# Patient Record
Sex: FEMALE | Race: BLACK OR AFRICAN AMERICAN | NOT HISPANIC OR LATINO | Employment: OTHER | URBAN - METROPOLITAN AREA
[De-identification: names, ages, dates, MRNs, and addresses within clinical notes are randomized per-mention and may not be internally consistent; named-entity substitution may affect disease eponyms.]

---

## 2023-11-17 RX ORDER — LEVETIRACETAM 500 MG/1
500 TABLET ORAL 2 TIMES DAILY
COMMUNITY
Start: 2014-04-10

## 2023-11-17 RX ORDER — GABAPENTIN 800 MG/1
800 TABLET ORAL 3 TIMES DAILY
COMMUNITY

## 2023-11-17 RX ORDER — GABAPENTIN 100 MG/1
CAPSULE ORAL 2 TIMES DAILY
Status: ON HOLD | COMMUNITY
End: 2024-01-17 | Stop reason: WASHOUT

## 2023-11-29 ENCOUNTER — OFFICE VISIT (OUTPATIENT)
Dept: OTOLARYNGOLOGY | Facility: CLINIC | Age: 68
End: 2023-11-29
Payer: MEDICARE

## 2023-11-29 VITALS — BODY MASS INDEX: 33.63 KG/M2 | WEIGHT: 178 LBS

## 2023-11-29 DIAGNOSIS — H90.3 SENSORINEURAL HEARING LOSS, BILATERAL: ICD-10-CM

## 2023-11-29 DIAGNOSIS — H92.03 OTALGIA OF BOTH EARS: Primary | ICD-10-CM

## 2023-11-29 DIAGNOSIS — M26.609 TEMPOROMANDIBULAR JOINT DISORDER: ICD-10-CM

## 2023-11-29 DIAGNOSIS — M54.2 NECK PAIN: ICD-10-CM

## 2023-11-29 PROCEDURE — 1159F MED LIST DOCD IN RCRD: CPT | Performed by: GENERAL PRACTICE

## 2023-11-29 PROCEDURE — 1036F TOBACCO NON-USER: CPT | Performed by: GENERAL PRACTICE

## 2023-11-29 PROCEDURE — 99214 OFFICE O/P EST MOD 30 MIN: CPT | Performed by: GENERAL PRACTICE

## 2023-11-29 RX ORDER — CARBIDOPA AND LEVODOPA 25; 100 MG/1; MG/1
2 TABLET ORAL 3 TIMES DAILY
COMMUNITY

## 2023-11-29 RX ORDER — CITALOPRAM 10 MG/1
TABLET ORAL
Status: ON HOLD | COMMUNITY
Start: 2021-11-25 | End: 2024-01-17 | Stop reason: WASHOUT

## 2023-11-29 RX ORDER — ARIPIPRAZOLE 5 MG/1
1 TABLET ORAL DAILY
Status: ON HOLD | COMMUNITY
End: 2024-01-17 | Stop reason: WASHOUT

## 2023-11-29 RX ORDER — VIT C/E/ZN/COPPR/LUTEIN/ZEAXAN 250MG-90MG
50 CAPSULE ORAL DAILY
COMMUNITY

## 2023-11-29 RX ORDER — BUSPIRONE HYDROCHLORIDE 10 MG/1
10 TABLET ORAL 2 TIMES DAILY
COMMUNITY
Start: 2022-11-02

## 2023-11-29 ASSESSMENT — PATIENT HEALTH QUESTIONNAIRE - PHQ9
SUM OF ALL RESPONSES TO PHQ9 QUESTIONS 1 AND 2: 0
2. FEELING DOWN, DEPRESSED OR HOPELESS: NOT AT ALL
1. LITTLE INTEREST OR PLEASURE IN DOING THINGS: NOT AT ALL

## 2023-11-29 NOTE — PROGRESS NOTES
Otolaryngology - Head and Neck Surgery Outpatient New Patient Visit Note        Assessment/Plan   Problem List Items Addressed This Visit    None  Visit Diagnoses         Codes    Otalgia of both ears    -  Primary H92.03    Neck pain     M54.2    Relevant Orders    Referral to Physical Therapy    Temporomandibular joint disorder     M26.609    Relevant Orders    Referral to Physical Therapy    Sensorineural hearing loss, bilateral     H90.3            Recent poorly defined ear/neck discomfort without evidence of otitis on exam today or  prior CT head from earlier in the year.    Discussed possible association with TMJ and neck discomforts.  Pt seeing dental/OMFS for dental issues and agree with ongoing management for this.  Discussed conservative management for TMJ.     Will place referral for PT.     Discussed possible further imaging or referral for 2nd opinion but pt and family decline for now.         The etiology of temporomandibular joint disorders (TMD) was discussed in detail with patient including: structural issues such as alterations in dental occlusion (the alignment of the upper and lower teeth) that affect maxillomandibular position and function. These issues may or may not be associated with joint trauma, poor posture or cervicogenic etiologies.  Possible psychologic factors include anxiety, depression and PTSD. Multiple other contributing and comorbid factors, including biological, behavioral, environmental, and cognitive disorders which can all contribute to the development of symptoms were also reviewed with the patient.      PLAN:  1) Lifestyle interventions for management of TMJ dysfunction were discussed with the patient today including use of warm compresses, massage of the TMJ joint, and/or use of NSAIDS PRN for pain and intimation and soft chew diet.      Additional options for further evaluation and management of TMD were discussed with the patient today and may include the following  referrals upon patient request:  1) Referral to physical therapy for further evaluation and management of cervicogenic/ postural related issues.  2) Referral to [facility] for evaluation and management with alternative therapeutic modalities such as massage or acupuncture.   3) Referral to dental medicine for further evaluation and management of structural/ mechanical concerns such as malocclusion or bruxism.       Follow up:  -plan for follow up in clinic as needed    All of the above findings, impressions, treatment planning and follow up plans were discussed with the patient who indicated understanding.  the patient was instructed to contact or return to clinic sooner if symptoms/signs persist or worsen despite the above management.      Abelardo Amezquita MD  Otolaryngology - Head and Neck Surgery            History Of Present Illness  Darwin Castro is a 68 y.o. female presenting for follow up of intermittent ear discomfort.   Brother present to aid in history due to history of stroke and associated aphasia.     History somewhat difficult to obtain, but pt reports intermittent ear discomfort noted on each side at different times.  No obvious pattern of discomfort.  Some possible radiation to neck or jaw.   Recently seen by dental with plans to address dental infection of front upper incisor.  Denies recent symptoms of rhinitis or sinusitis.      Audiogram  completed jan2023 showed mild downsloping to moderate SNHL without middle ear dysfunction.  CT head obtained in feb2023 after a fall showed no evidence of otitis or sinusitis.        Recall    67yoF with a history of parkinsons ,hemiparesis and aphasia brought in for discussion of concerns of ear discomfort and some hearing loss.      the pt is a fairly poor historian and escort/chaperone has limited information.   the pt reports intermittent left otalgia, more noted when sleeping on the left side. no associated hearing change, otorrhea, vertigo. some  baseline hearing loss, but no recent hearing evaluation.   no signficant prior history of recurrent otitis or otologic surgery/trauma.  no inciting illness or trauma for recent symptoms.   pain does not radiate.   denies TMJ region discomfort with chewing.   notes some history of L>R neck MSK discomfort which may be associated with ear pain.             Past Medical History  She has a past medical history of Bipolar disorder, current episode manic without psychotic features, unspecified (CMS/HCC), Chronic viral hepatitis B without delta-agent (CMS/HCC), Cocaine abuse, in remission (CMS/HCC), Other conditions influencing health status, Personal history of other diseases of the circulatory system, Personal history of other infectious and parasitic diseases, and Personal history of other venous thrombosis and embolism.    Surgical History  She has a past surgical history that includes Other surgical history (10/15/2019).     Social History  She reports that she has never smoked. She has never used smokeless tobacco. No history on file for alcohol use and drug use.    Family History  No family history on file.     Allergies  Penicillins    Review of Systems  ROS: Pertinent positives as noted in HPI.    - CONSTITUTIONAL: Does not report weight loss, fever or chills.    - HEENT:   Ear: Does not report  , vertigo,       , otorrhea  Nose: Does not report congestion, rhinorrhea, epistaxis, decreased smell  Throat: Does not report pain, dysphagia, odynophagia  Larynx: Does not report hoarseness,  difficulty breathing, pain with speaking (odynophonia)  Neck: Does not report new masses, pain, swelling  Face: Does not report sinus pain, pressure, swelling, numbness, weakness     - RESPIRATORY: Does not report SOB or cough.    - CV: Does not report palpitations or chest pain.     - GI: Does not report abdominal pain, nausea, vomiting or diarrhea.    - : Does not report dysuria or urinary frequency.    - MSK: Does not report  myalgia or joint pain.    - SKIN: Does not report rash or pruritus.    - NEUROLOGICAL: Does not report headache or syncope.    - PSYCHIATRIC: Does not report recent changes in mood. Does not report anxiety or depression.         Physical Exam:     GENERAL:   Alert & Oriented to person, place and time;   Well developed and well nourished. Poorly Conversant but  cooperative with examination.     HEAD:   Normocephalic, atraumatic. No sinus tenderness to palpation. Normal parotid bilaterally. Normal facial strength.     NEUROLOGIC:   Cranial nerves II-XII grossly intact, gait WNL. Normal mood and affect.    EYES:   Extraocular movements intact. Pupils equal, round, reactive to light and accommodation. No nystagmus, no ptosis. no scleral injection.    EAR:   Normal auricle. No discomfort or TTP with manipulation.   Handheld otoscopic exam showed normal external auditory canals bilaterally. No purulence or EAC inflammation. Minimal cerumen.   Right tympanic membrane clear and mobile without evidence of perforation, retraction or middle ear effusion.   Left tympanic membrane clear and mobile without evidence of perforation, retraction or middle ear effusion.     NOSE:   No external deformity. No external nasal lesions, lacerations, or scars. Nasal tip symmetrical with normal nasal valves.   Nasal cavity with essentially midline septum, normal mucosa and turbinates. No lesions, masses, purulence or polyps.     OC/OP:   Mucous membranes moist, no masses, lesions or exudates.   Normal tongue, floor of mouth, teeth, gums, lips. Normal posterior pharyngeal wall.    Normal tonsils without erythema, exudate or obvious calculi     NECK:   No neck masses or thyroid enlargement. Trachea midline. No tenderness to palpation    LYMPHATIC:   No cervical lymphadenopathy.     RESPIRATORY:   Symmetric chest elevation & no retractions. No significant hoarseness. No increased work of breathing.    CV:   No clubbing or cyanosis. No obvious  edema    Skin:   No facial rashes, vesicles or lesions.     Extremities:   No gross abnormalities      Clinic Procedure        Information review:  External sources (notes, imaging, lab results) listed below personally reviewed to aid in medical decision making process.  -  -  -

## 2023-12-07 ENCOUNTER — OFFICE VISIT (OUTPATIENT)
Dept: NEUROLOGY | Facility: CLINIC | Age: 68
End: 2023-12-07
Payer: MEDICARE

## 2023-12-07 VITALS — DIASTOLIC BLOOD PRESSURE: 61 MMHG | HEART RATE: 59 BPM | SYSTOLIC BLOOD PRESSURE: 141 MMHG | TEMPERATURE: 96.9 F

## 2023-12-07 DIAGNOSIS — I69.351 HEMIPARESIS AFFECTING RIGHT SIDE AS LATE EFFECT OF CEREBROVASCULAR ACCIDENT (MULTI): ICD-10-CM

## 2023-12-07 DIAGNOSIS — G40.209 PARTIAL SYMPTOMATIC EPILEPSY WITH COMPLEX PARTIAL SEIZURES, NOT INTRACTABLE, WITHOUT STATUS EPILEPTICUS (MULTI): ICD-10-CM

## 2023-12-07 DIAGNOSIS — G20.A1 PARKINSON'S DISEASE WITHOUT DYSKINESIA OR FLUCTUATING MANIFESTATIONS (MULTI): ICD-10-CM

## 2023-12-07 DIAGNOSIS — I69.320 APHASIA AS LATE EFFECT OF CEREBROVASCULAR ACCIDENT: ICD-10-CM

## 2023-12-07 DIAGNOSIS — R51.9 NONINTRACTABLE HEADACHE, UNSPECIFIED CHRONICITY PATTERN, UNSPECIFIED HEADACHE TYPE: Primary | ICD-10-CM

## 2023-12-07 PROCEDURE — 1159F MED LIST DOCD IN RCRD: CPT | Performed by: PSYCHIATRY & NEUROLOGY

## 2023-12-07 PROCEDURE — 1036F TOBACCO NON-USER: CPT | Performed by: PSYCHIATRY & NEUROLOGY

## 2023-12-07 PROCEDURE — 99214 OFFICE O/P EST MOD 30 MIN: CPT | Performed by: PSYCHIATRY & NEUROLOGY

## 2023-12-07 NOTE — PATIENT INSTRUCTIONS
Continue gabapentin 800 mg tid for headache prevention    Continue carbidopa/levodopa  three times daily with meals for Parkinsonism    Continue levetiracetam 500 mg twice daily for seizure control

## 2023-12-07 NOTE — PROGRESS NOTES
NEUROLOGY OUTPATIENT FOLLOW-UP NOTE    Assessment/Plan   Diagnoses and all orders for this visit:  Nonintractable headache, unspecified chronicity pattern, unspecified headache type  Parkinson's disease without dyskinesia or fluctuating manifestations  Hemiparesis affecting right side as late effect of cerebrovascular accident (CMS/HCC)  Aphasia as late effect of cerebrovascular accident  Partial symptomatic epilepsy with complex partial seizures, not intractable, without status epilepticus (CMS/HCC)      IMPRESSION: Stably improved chronic daily headaches in the context of prior craniotomy, Stable Parkinson's disease. Residual right hemiparesis and aphasia referable to prior stroke. Controlled partial seizures on levetiracetam.     PLAN: Continue gabapentin 800 mg tid for headache prophylaxis. To continue the current dose of carbidopa/levodopa  tid with meals for the PD. To continue levetiracetam 500 mg bid for seizure prophylaxis. I will see her in six months or prn.     Crispin Marte Jr., M.D., FAAN    ----------    Subjective     Reangélica LYNDSAY Castro is a 68 y.o. year old female here for follow-up.  Her brother accompanies her today.  She is a resident of [facility].    Gabapentin 800 mg tid stably reduces headache severity.     No seizures on levetiracetam. She remains satisfied with the effect of the current dose of the carbidopa/levodopa on the Parkinson's.      Remains with right hemiparesis and mild expressive aphasia from prior stroke.      She denies other focal neurological symptoms including dysarthria, dysphagia, diplopia, other focal weakness, other focal sensory change, ataxia, or vertigo, among others.     There is no problem list on file for this patient.    Past Medical History:   Diagnosis Date    Bipolar disorder, current episode manic without psychotic features, unspecified (CMS/HCC)     Bipolar disorder, current episode manic without psychotic features    Chronic viral hepatitis B  without delta-agent (CMS/HCC)     Chronic hepatitis B    Cocaine abuse, in remission (CMS/HCC)     History of cocaine abuse    Other conditions influencing health status     Stroke syndrome    Personal history of other diseases of the circulatory system     History of hypertension    Personal history of other infectious and parasitic diseases     History of hepatitis C virus infection    Personal history of other venous thrombosis and embolism     History of deep venous thrombosis     Past Surgical History:   Procedure Laterality Date    OTHER SURGICAL HISTORY  10/15/2019    Brain surgery     Social History     Tobacco Use    Smoking status: Never    Smokeless tobacco: Never   Substance Use Topics    Alcohol use: Never     family history is not on file.    Current Outpatient Medications:     ARIPiprazole (Abilify) 5 mg tablet, Take 1 tablet (5 mg) by mouth once daily., Disp: , Rfl:     busPIRone (Buspar) 10 mg tablet, Take by mouth twice a day., Disp: , Rfl:     carbidopa-levodopa (Sinemet)  mg tablet, Take by mouth., Disp: , Rfl:     cholecalciferol (Vitamin D-3) 25 MCG (1000 UT) capsule, Take by mouth., Disp: , Rfl:     citalopram (CeleXA) 10 mg tablet, Take by mouth., Disp: , Rfl:     gabapentin (Neurontin) 100 mg capsule, Take by mouth twice a day., Disp: , Rfl:     gabapentin (Neurontin) 800 mg tablet, Take by mouth., Disp: , Rfl:     levETIRAcetam (Keppra) 500 mg tablet, Take by mouth., Disp: , Rfl:   Allergies   Allergen Reactions    Penicillins Unknown       Objective     /61   Pulse 59   Temp 36.1 °C (96.9 °F)     CONSTITUTIONAL:  No acute distress    MENTAL STATUS:  Awake, alert, oriented to self, but not to place or time, with poor short-term memory, fair awareness of recent events, normal attention span, concentration, and fund of knowledge.    SPEECH AND LANGUAGE:  Dysnomia, dysfluent speech, but can follow most commands, has difficulty with complex commands, has no dysarthria    CRANIAL  NERVES:  II-Vision present, visual fields full to confrontational testing    III/IV/VI--EOMs are present in all directions.  Pupils are symmetrically reactive in dim light.  No ptosis.    V--Normal facial sensation.    VII--No facial asymmetry.    VIII--Hearing present to voice bilaterally.    IX/X--Symmetric soft palate rise.    XI--Normal trapezius power on the left, 4/5 right.    XII--Tongue protrudes without deviation.    MOTOR:  Diffuse right arm weakness: 4/5 deltoid and biceps, 3/5 triceps, 2/5 finger ext, 3/5 finger flex/, diffuse 4/5 right leg power. Hypertonia of the right limbs.     SENSORY:  Present pin sensation in both arms and both legs.    COORDINATION:  Normal finger-to-nose and heel-to-shin testing in both arms and both legs.    REFLEXES are brisk at the right biceps, triceps, brachioradialis, patella, normal on the left, depressed at both ankles.  The plantar responses are flexor.    GAIT did not evaluate this time, but it has been spastic with external rotation of the right leg in the past.      Crispin Marte Jr., M.D., FAAN

## 2024-01-16 ENCOUNTER — APPOINTMENT (OUTPATIENT)
Dept: RADIOLOGY | Facility: HOSPITAL | Age: 69
End: 2024-01-16
Payer: MEDICARE

## 2024-01-16 ENCOUNTER — HOSPITAL ENCOUNTER (OUTPATIENT)
Facility: HOSPITAL | Age: 69
Setting detail: OBSERVATION
Discharge: NURSING, REHAB OR LONG-TERM CARE | End: 2024-01-18
Attending: EMERGENCY MEDICINE | Admitting: INTERNAL MEDICINE
Payer: MEDICARE

## 2024-01-16 DIAGNOSIS — R05.1 ACUTE COUGH: ICD-10-CM

## 2024-01-16 DIAGNOSIS — R41.82 ALTERED MENTAL STATUS, UNSPECIFIED ALTERED MENTAL STATUS TYPE: ICD-10-CM

## 2024-01-16 DIAGNOSIS — G45.9 TIA (TRANSIENT ISCHEMIC ATTACK): Primary | ICD-10-CM

## 2024-01-16 DIAGNOSIS — R53.1 LEFT-SIDED WEAKNESS: ICD-10-CM

## 2024-01-16 PROBLEM — F14.11 COCAINE ABUSE, IN REMISSION (MULTI): Status: ACTIVE | Noted: 2024-01-16

## 2024-01-16 PROBLEM — G20.A1 PARKINSON'S DISEASE (MULTI): Status: ACTIVE | Noted: 2024-01-16

## 2024-01-16 PROBLEM — G40.209 PARTIAL SYMPTOMATIC EPILEPSY WITH COMPLEX PARTIAL SEIZURES, NOT INTRACTABLE, WITHOUT STATUS EPILEPTICUS (MULTI): Status: ACTIVE | Noted: 2024-01-16

## 2024-01-16 PROBLEM — B18.1: Status: ACTIVE | Noted: 2024-01-16

## 2024-01-16 PROBLEM — Z86.718 HISTORY OF DVT (DEEP VEIN THROMBOSIS): Chronic | Status: ACTIVE | Noted: 2024-01-16

## 2024-01-16 PROBLEM — B18.1 CHRONIC VIRAL HEPATITIS B (MULTI): Chronic | Status: ACTIVE | Noted: 2024-01-16

## 2024-01-16 PROBLEM — Z86.718 PERSONAL HISTORY OF OTHER VENOUS THROMBOSIS AND EMBOLISM: Status: ACTIVE | Noted: 2024-01-16

## 2024-01-16 PROBLEM — F31.10 BIPOLAR DISORDER, CURRENT EPISODE MANIC WITHOUT PSYCHOTIC FEATURES, UNSPECIFIED (MULTI): Status: ACTIVE | Noted: 2024-01-16

## 2024-01-16 PROBLEM — Z86.19 HISTORY OF HEPATITIS C VIRUS INFECTION: Status: ACTIVE | Noted: 2024-01-16

## 2024-01-16 PROBLEM — G81.90 HEMIPARESIS (MULTI): Status: ACTIVE | Noted: 2023-02-08

## 2024-01-16 PROBLEM — I69.320 APHASIA FOLLOWING CEREBROVASCULAR ACCIDENT (CVA): Status: ACTIVE | Noted: 2024-01-16

## 2024-01-16 PROBLEM — I10 ESSENTIAL (PRIMARY) HYPERTENSION: Status: ACTIVE | Noted: 2023-02-08

## 2024-01-16 LAB
ALBUMIN SERPL BCP-MCNC: 3.9 G/DL (ref 3.4–5)
ALP SERPL-CCNC: 61 U/L (ref 33–136)
ALT SERPL W P-5'-P-CCNC: <3 U/L (ref 7–45)
ANION GAP SERPL CALC-SCNC: 11 MMOL/L (ref 10–20)
APPEARANCE UR: CLEAR
AST SERPL W P-5'-P-CCNC: 22 U/L (ref 9–39)
BASOPHILS # BLD AUTO: 0.03 X10*3/UL (ref 0–0.1)
BASOPHILS NFR BLD AUTO: 0.4 %
BILIRUB SERPL-MCNC: 0.4 MG/DL (ref 0–1.2)
BILIRUB UR STRIP.AUTO-MCNC: NEGATIVE MG/DL
BUN SERPL-MCNC: 9 MG/DL (ref 6–23)
CALCIUM SERPL-MCNC: 8.9 MG/DL (ref 8.6–10.3)
CARDIAC TROPONIN I PNL SERPL HS: 10 NG/L (ref 0–13)
CHLORIDE SERPL-SCNC: 99 MMOL/L (ref 98–107)
CHOLEST SERPL-MCNC: 173 MG/DL (ref 0–199)
CHOLESTEROL/HDL RATIO: 2.8
CO2 SERPL-SCNC: 30 MMOL/L (ref 21–32)
COLOR UR: YELLOW
CREAT SERPL-MCNC: 0.86 MG/DL (ref 0.5–1.05)
EGFRCR SERPLBLD CKD-EPI 2021: 74 ML/MIN/1.73M*2
EOSINOPHIL # BLD AUTO: 0.01 X10*3/UL (ref 0–0.7)
EOSINOPHIL NFR BLD AUTO: 0.1 %
ERYTHROCYTE [DISTWIDTH] IN BLOOD BY AUTOMATED COUNT: 13 % (ref 11.5–14.5)
FLUAV RNA RESP QL NAA+PROBE: NOT DETECTED
FLUBV RNA RESP QL NAA+PROBE: NOT DETECTED
GLUCOSE BLD MANUAL STRIP-MCNC: 94 MG/DL (ref 74–99)
GLUCOSE SERPL-MCNC: 86 MG/DL (ref 74–99)
GLUCOSE UR STRIP.AUTO-MCNC: NEGATIVE MG/DL
HCT VFR BLD AUTO: 41.9 % (ref 36–46)
HDLC SERPL-MCNC: 61.6 MG/DL
HGB BLD-MCNC: 13.3 G/DL (ref 12–16)
IMM GRANULOCYTES # BLD AUTO: 0.02 X10*3/UL (ref 0–0.7)
IMM GRANULOCYTES NFR BLD AUTO: 0.3 % (ref 0–0.9)
INR PPP: 1.1 (ref 0.9–1.1)
KETONES UR STRIP.AUTO-MCNC: ABNORMAL MG/DL
LDLC SERPL CALC-MCNC: 100 MG/DL
LEUKOCYTE ESTERASE UR QL STRIP.AUTO: NEGATIVE
LYMPHOCYTES # BLD AUTO: 2.37 X10*3/UL (ref 1.2–4.8)
LYMPHOCYTES NFR BLD AUTO: 35.3 %
MAGNESIUM SERPL-MCNC: 1.6 MG/DL (ref 1.6–2.4)
MCH RBC QN AUTO: 30 PG (ref 26–34)
MCHC RBC AUTO-ENTMCNC: 31.7 G/DL (ref 32–36)
MCV RBC AUTO: 94 FL (ref 80–100)
MONOCYTES # BLD AUTO: 0.95 X10*3/UL (ref 0.1–1)
MONOCYTES NFR BLD AUTO: 14.2 %
MUCOUS THREADS #/AREA URNS AUTO: NORMAL /LPF
NEUTROPHILS # BLD AUTO: 3.33 X10*3/UL (ref 1.2–7.7)
NEUTROPHILS NFR BLD AUTO: 49.7 %
NITRITE UR QL STRIP.AUTO: NEGATIVE
NON HDL CHOLESTEROL: 111 MG/DL (ref 0–149)
NRBC BLD-RTO: 0 /100 WBCS (ref 0–0)
PH UR STRIP.AUTO: 5 [PH]
PLATELET # BLD AUTO: 158 X10*3/UL (ref 150–450)
POTASSIUM SERPL-SCNC: 3.3 MMOL/L (ref 3.5–5.3)
PROT SERPL-MCNC: 7.3 G/DL (ref 6.4–8.2)
PROT UR STRIP.AUTO-MCNC: ABNORMAL MG/DL
PROTHROMBIN TIME: 12.4 SECONDS (ref 9.8–12.8)
RBC # BLD AUTO: 4.44 X10*6/UL (ref 4–5.2)
RBC # UR STRIP.AUTO: NEGATIVE /UL
RBC #/AREA URNS AUTO: NORMAL /HPF
SARS-COV-2 RNA RESP QL NAA+PROBE: DETECTED
SODIUM SERPL-SCNC: 137 MMOL/L (ref 136–145)
SP GR UR STRIP.AUTO: 1.06
SQUAMOUS #/AREA URNS AUTO: NORMAL /HPF
TRIGL SERPL-MCNC: 55 MG/DL (ref 0–149)
TSH SERPL-ACNC: 0.9 MIU/L (ref 0.44–3.98)
UROBILINOGEN UR STRIP.AUTO-MCNC: 2 MG/DL
VLDL: 11 MG/DL (ref 0–40)
WBC # BLD AUTO: 6.7 X10*3/UL (ref 4.4–11.3)
WBC #/AREA URNS AUTO: NORMAL /HPF

## 2024-01-16 PROCEDURE — 70496 CT ANGIOGRAPHY HEAD: CPT | Performed by: RADIOLOGY

## 2024-01-16 PROCEDURE — 80177 DRUG SCRN QUAN LEVETIRACETAM: CPT | Mod: AHULAB | Performed by: EMERGENCY MEDICINE

## 2024-01-16 PROCEDURE — 2500000001 HC RX 250 WO HCPCS SELF ADMINISTERED DRUGS (ALT 637 FOR MEDICARE OP): Performed by: EMERGENCY MEDICINE

## 2024-01-16 PROCEDURE — 80061 LIPID PANEL: CPT | Performed by: PHYSICIAN ASSISTANT

## 2024-01-16 PROCEDURE — 99222 1ST HOSP IP/OBS MODERATE 55: CPT | Performed by: PHYSICIAN ASSISTANT

## 2024-01-16 PROCEDURE — 99285 EMERGENCY DEPT VISIT HI MDM: CPT | Performed by: EMERGENCY MEDICINE

## 2024-01-16 PROCEDURE — 71045 X-RAY EXAM CHEST 1 VIEW: CPT | Mod: FOREIGN READ | Performed by: RADIOLOGY

## 2024-01-16 PROCEDURE — 70498 CT ANGIOGRAPHY NECK: CPT

## 2024-01-16 PROCEDURE — 83735 ASSAY OF MAGNESIUM: CPT | Performed by: EMERGENCY MEDICINE

## 2024-01-16 PROCEDURE — 82947 ASSAY GLUCOSE BLOOD QUANT: CPT | Mod: 59

## 2024-01-16 PROCEDURE — 83036 HEMOGLOBIN GLYCOSYLATED A1C: CPT | Mod: AHULAB | Performed by: PHYSICIAN ASSISTANT

## 2024-01-16 PROCEDURE — G0378 HOSPITAL OBSERVATION PER HR: HCPCS

## 2024-01-16 PROCEDURE — 85610 PROTHROMBIN TIME: CPT | Performed by: EMERGENCY MEDICINE

## 2024-01-16 PROCEDURE — 96361 HYDRATE IV INFUSION ADD-ON: CPT | Performed by: EMERGENCY MEDICINE

## 2024-01-16 PROCEDURE — 70450 CT HEAD/BRAIN W/O DYE: CPT

## 2024-01-16 PROCEDURE — 84484 ASSAY OF TROPONIN QUANT: CPT | Performed by: EMERGENCY MEDICINE

## 2024-01-16 PROCEDURE — 70498 CT ANGIOGRAPHY NECK: CPT | Performed by: RADIOLOGY

## 2024-01-16 PROCEDURE — 80053 COMPREHEN METABOLIC PANEL: CPT | Performed by: EMERGENCY MEDICINE

## 2024-01-16 PROCEDURE — 96365 THER/PROPH/DIAG IV INF INIT: CPT | Performed by: EMERGENCY MEDICINE

## 2024-01-16 PROCEDURE — 36415 COLL VENOUS BLD VENIPUNCTURE: CPT | Performed by: EMERGENCY MEDICINE

## 2024-01-16 PROCEDURE — 71045 X-RAY EXAM CHEST 1 VIEW: CPT

## 2024-01-16 PROCEDURE — 87636 SARSCOV2 & INF A&B AMP PRB: CPT | Performed by: EMERGENCY MEDICINE

## 2024-01-16 PROCEDURE — 81001 URINALYSIS AUTO W/SCOPE: CPT | Performed by: EMERGENCY MEDICINE

## 2024-01-16 PROCEDURE — 85025 COMPLETE CBC W/AUTO DIFF WBC: CPT | Performed by: EMERGENCY MEDICINE

## 2024-01-16 PROCEDURE — 70496 CT ANGIOGRAPHY HEAD: CPT

## 2024-01-16 PROCEDURE — 84443 ASSAY THYROID STIM HORMONE: CPT | Performed by: EMERGENCY MEDICINE

## 2024-01-16 PROCEDURE — 2550000001 HC RX 255 CONTRASTS: Performed by: EMERGENCY MEDICINE

## 2024-01-16 PROCEDURE — 70450 CT HEAD/BRAIN W/O DYE: CPT | Performed by: RADIOLOGY

## 2024-01-16 PROCEDURE — 2500000004 HC RX 250 GENERAL PHARMACY W/ HCPCS (ALT 636 FOR OP/ED): Performed by: EMERGENCY MEDICINE

## 2024-01-16 RX ORDER — ASPIRIN 81 MG/1
81 TABLET ORAL DAILY
Status: DISCONTINUED | OUTPATIENT
Start: 2024-01-16 | End: 2024-01-18 | Stop reason: HOSPADM

## 2024-01-16 RX ORDER — INSULIN LISPRO 100 [IU]/ML
0-5 INJECTION, SOLUTION INTRAVENOUS; SUBCUTANEOUS
Status: DISCONTINUED | OUTPATIENT
Start: 2024-01-17 | End: 2024-01-18 | Stop reason: HOSPADM

## 2024-01-16 RX ORDER — DOCUSATE SODIUM 100 MG/1
100 CAPSULE, LIQUID FILLED ORAL 2 TIMES DAILY
Status: DISCONTINUED | OUTPATIENT
Start: 2024-01-16 | End: 2024-01-18 | Stop reason: HOSPADM

## 2024-01-16 RX ORDER — ENOXAPARIN SODIUM 100 MG/ML
40 INJECTION SUBCUTANEOUS EVERY 24 HOURS
Status: DISCONTINUED | OUTPATIENT
Start: 2024-01-16 | End: 2024-01-18 | Stop reason: HOSPADM

## 2024-01-16 RX ORDER — ACETAMINOPHEN 325 MG/1
975 TABLET ORAL EVERY 6 HOURS PRN
Status: DISCONTINUED | OUTPATIENT
Start: 2024-01-16 | End: 2024-01-18 | Stop reason: HOSPADM

## 2024-01-16 RX ORDER — DEXTROSE 50 % IN WATER (D50W) INTRAVENOUS SYRINGE
25
Status: DISCONTINUED | OUTPATIENT
Start: 2024-01-16 | End: 2024-01-18 | Stop reason: HOSPADM

## 2024-01-16 RX ORDER — BENZONATATE 100 MG/1
100 CAPSULE ORAL 3 TIMES DAILY PRN
Status: DISCONTINUED | OUTPATIENT
Start: 2024-01-16 | End: 2024-01-18 | Stop reason: HOSPADM

## 2024-01-16 RX ORDER — ATORVASTATIN CALCIUM 40 MG/1
40 TABLET, FILM COATED ORAL NIGHTLY
Status: DISCONTINUED | OUTPATIENT
Start: 2024-01-16 | End: 2024-01-18 | Stop reason: HOSPADM

## 2024-01-16 RX ORDER — LEVETIRACETAM 15 MG/ML
1500 INJECTION INTRAVASCULAR ONCE
Status: COMPLETED | OUTPATIENT
Start: 2024-01-16 | End: 2024-01-16

## 2024-01-16 RX ORDER — DEXTROSE MONOHYDRATE 100 MG/ML
30 INJECTION, SOLUTION INTRAVENOUS ONCE AS NEEDED
Status: DISCONTINUED | OUTPATIENT
Start: 2024-01-16 | End: 2024-01-18 | Stop reason: HOSPADM

## 2024-01-16 RX ORDER — PANTOPRAZOLE SODIUM 40 MG/1
40 TABLET, DELAYED RELEASE ORAL
Status: DISCONTINUED | OUTPATIENT
Start: 2024-01-17 | End: 2024-01-18 | Stop reason: HOSPADM

## 2024-01-16 RX ORDER — NAPROXEN SODIUM 220 MG/1
324 TABLET, FILM COATED ORAL ONCE
Status: COMPLETED | OUTPATIENT
Start: 2024-01-16 | End: 2024-01-16

## 2024-01-16 RX ADMIN — LEVETIRACETAM 1500 MG: 15 INJECTION INTRAVENOUS at 21:04

## 2024-01-16 RX ADMIN — IOHEXOL 75 ML: 350 INJECTION, SOLUTION INTRAVENOUS at 18:42

## 2024-01-16 RX ADMIN — SODIUM CHLORIDE, POTASSIUM CHLORIDE, SODIUM LACTATE AND CALCIUM CHLORIDE 1000 ML: 600; 310; 30; 20 INJECTION, SOLUTION INTRAVENOUS at 19:12

## 2024-01-16 RX ADMIN — ASPIRIN 324 MG: 81 TABLET, CHEWABLE ORAL at 19:12

## 2024-01-16 NOTE — ED PROVIDER NOTES
History/Exam limitations: none.   Additional history was obtained from patient, relative(s), EMS personnel, nursing home, and past medical records.    HPI:    Darwin Castro is a 68 y.o. female PMH PMH CVA with residual right-sided deficit and aphasia, Parkinson disease, headaches presenting with altered mentation.  Per nursing staff at the facility that he spoke to via phone they noticed that she was relatively lethargic at 3 PM today.  Unknown when she was seen prior to that and they saw her again and brought in one of the directors to assess her and they confirmed that she was not well at 3 PM today.  Unknown last known well but spoke to family sometime yesterday on the phone and seem to be herself.  Patient denies any headache, chest pain, shortness breath, abdominal pain, diarrhea, dysuria.  Glucose reassuring in route.      Last Known Well Time: unknown, reportedly sometime yesterday afternoon      Physical Exam:  ED Triage Vitals [01/16/24 1756]   Temp Heart Rate Resp BP   36.9 °C (98.4 °F) 95 16 116/77      SpO2 Temp Source Heart Rate Source Patient Position   99 % Oral Monitor Lying      BP Location FiO2 (%)     Left arm --        GEN:      Alert, NAD  Eyes:       PERRL, EOMI  HENT:      NC/AT, OP clear, airway patent, MM  CV:      RRR, no MRG, no LE pitting edema, 2+ radial and pedal pulses  PULM:     CTAB, no w/r/r, easy WOB, symmetric chest rise  ABD:      Soft, NT, ND, no masses, BS +  :       No CVA TTP  NEURO:   See NIHSS below    MSK:      FROM, no joint deformities or swelling, no e/o trauma  SKIN:       Warm and dry  PSYCH:    Appropriate mood and affect        Interval: Baseline  Time: 6:08 PM  Person Administering Scale: Chris Antunez MD     1a  Level of consciousness: 1=not alert but arousable by minor stimulation to obey, answer or respond   1b. LOC questions:  1=Performs one task correctly   1c. LOC commands: 0=Performs both tasks correctly   2.  Best Gaze: 0=normal   3. Visual: 0=No  visual loss   4. Facial Palsy: 1=Minor paralysis (flattened nasolabial fold, asymmetric on smiling)   5a. Motor left arm: 0=No drift, limb holds 90 (or 45) degrees for full 10 seconds   5b.  Motor right arm: 4=No movement   6a. motor left le=Some effort against gravity, limb cannot get to or maintain (if cured) 90 (or 45) degrees, drifts down to bed, but has some effort against gravity   6b  Motor right leg:  3=No effort against gravity, limb falls   7. Limb Ataxia: 0=Absent   8.  Sensory: 0=Normal; no sensory loss   9. Best Language:  0=No aphasia, normal   10. Dysarthria: 1=Mild to moderate, patient slurs at least some words and at worst, can be understood with some difficulty   11. Extinction and Inattention: 0=No abnormality   12. Distal motor function: 0=Normal     Total:   13         VAN: VAN: Negative          MDM/ED Course:   Darwin Castro is a 68 y.o. female PMH PMH CVA with residual right-sided deficit and aphasia, Parkinson disease, headaches presenting with altered mentation.  Vitals and exam as documented above.  Rectal exam negative.  Last known well is supposed to sometime yesterday afternoon, spoke to on-call nursing staff who is aware of patient at her facility and family members and facility staff report does not seem to resolve all day today.  Last spoke to family yesterday afternoon or evening.  Stroke alert initiated.  CT brain attack with no acute findings.  CT angio head and neck obtained and no evidence of acute findings, there is attenuation of the left M2 branch heading toward the region of her prior known encephalomalacia.  Differential includes underlying infectious etiology.  Differential includes seizure.  Differential includes atypical migraine.  Glucose reassuring.  CBC, CMP overall reassuring.  Patient given p.o. aspirin.  IV fluids were also given.  20 mg/kg dose of Keppra given as differential includes seizure and patient will require nighttime dose of 500 regardless.   Urinalysis reassuring.  Patient was found to be COVID-positive.  Patient's symptoms resolved and neurologic exam returned to baseline.  Patient family at bedside and confirms this.  Patient signed out to observation providers for continued management.  MRI to be ordered.  After time of admission patient reported that yesterday she noticed that her right shoulder was twitching.  Given this increased concern for seizure.  Admitting team updated regarding this.    EKG reviewed by me: 6:15 PM sinus rhythm, rate 66, normal axis, normal intervals, poor baseline, no ectopy, overall similar to prior EKGs.    ED Course as of 01/16/24 2144 Tue Jan 16, 2024 1857 On reassessment, patient's exam improved and her weakness resolved, no drift in left lower extremity or left upper extremity, very subtle distal right lower extremity appears consistent with most recent neurologic exam.  Little bit confused about the day but otherwise reassuring exam. [JM]   2144 On further history from patient's family at bedside, she reported some shoulder twitching that occurred yesterday and may have occurred days before as well.  She told family about this but did not tell any of the providers at the facility reportedly. [JM]      ED Course User Index  [JM] Chris Antunez MD         Chronic medical conditions affecting care listed in MDM. I independently reviewed imaging studies and agreed with radiology reads. I reviewed recent and relevant outside records including PCP notes, Prior discharge summaries, and prior radiology reports.      IV Thrombolysis:    No Thrombolysis contraindication reason: Time from Last Known Well (or stroke onset) is >4.5 hours     Procedure  Procedures     Diagnosis:   1.  Altered mental status  2.  Transient left-sided weakness  3.  COVID-19    Dispo: Hospitalized in stable condition      Disclaimer: Portions of this note were dictated by speech recognition. An attempt at proof reading was made to minimize  errors. Minor errors in transcription may be present.  Please call if questions.     Chris Antunez MD  01/19/24 4161

## 2024-01-17 ENCOUNTER — APPOINTMENT (OUTPATIENT)
Dept: CARDIOLOGY | Facility: HOSPITAL | Age: 69
End: 2024-01-17
Payer: MEDICARE

## 2024-01-17 PROBLEM — U07.1 COVID-19: Status: ACTIVE | Noted: 2024-01-17

## 2024-01-17 LAB
AMPHETAMINES UR QL SCN: NORMAL
ANION GAP SERPL CALC-SCNC: 12 MMOL/L (ref 10–20)
BARBITURATES UR QL SCN: NORMAL
BENZODIAZ UR QL SCN: NORMAL
BUN SERPL-MCNC: 6 MG/DL (ref 6–23)
BZE UR QL SCN: NORMAL
CALCIUM SERPL-MCNC: 8.8 MG/DL (ref 8.6–10.3)
CANNABINOIDS UR QL SCN: NORMAL
CHLORIDE SERPL-SCNC: 105 MMOL/L (ref 98–107)
CO2 SERPL-SCNC: 28 MMOL/L (ref 21–32)
CREAT SERPL-MCNC: 0.73 MG/DL (ref 0.5–1.05)
EGFRCR SERPLBLD CKD-EPI 2021: 90 ML/MIN/1.73M*2
ERYTHROCYTE [DISTWIDTH] IN BLOOD BY AUTOMATED COUNT: 13.1 % (ref 11.5–14.5)
EST. AVERAGE GLUCOSE BLD GHB EST-MCNC: 120 MG/DL
FENTANYL+NORFENTANYL UR QL SCN: NORMAL
GLUCOSE BLD MANUAL STRIP-MCNC: 119 MG/DL (ref 74–99)
GLUCOSE BLD MANUAL STRIP-MCNC: 169 MG/DL (ref 74–99)
GLUCOSE BLD MANUAL STRIP-MCNC: 83 MG/DL (ref 74–99)
GLUCOSE SERPL-MCNC: 75 MG/DL (ref 74–99)
HBA1C MFR BLD: 5.8 %
HCT VFR BLD AUTO: 43 % (ref 36–46)
HGB BLD-MCNC: 13.2 G/DL (ref 12–16)
LEVETIRACETAM SERPL-MCNC: 32 UG/ML (ref 10–40)
MAGNESIUM SERPL-MCNC: 1.7 MG/DL (ref 1.6–2.4)
MCH RBC QN AUTO: 30.1 PG (ref 26–34)
MCHC RBC AUTO-ENTMCNC: 30.7 G/DL (ref 32–36)
MCV RBC AUTO: 98 FL (ref 80–100)
NRBC BLD-RTO: 0 /100 WBCS (ref 0–0)
OPIATES UR QL SCN: NORMAL
OXYCODONE+OXYMORPHONE UR QL SCN: NORMAL
PCP UR QL SCN: NORMAL
PLATELET # BLD AUTO: 123 X10*3/UL (ref 150–450)
POTASSIUM SERPL-SCNC: 3.4 MMOL/L (ref 3.5–5.3)
RBC # BLD AUTO: 4.39 X10*6/UL (ref 4–5.2)
SODIUM SERPL-SCNC: 142 MMOL/L (ref 136–145)
WBC # BLD AUTO: 5.5 X10*3/UL (ref 4.4–11.3)

## 2024-01-17 PROCEDURE — 2500000002 HC RX 250 W HCPCS SELF ADMINISTERED DRUGS (ALT 637 FOR MEDICARE OP, ALT 636 FOR OP/ED): Performed by: PHYSICIAN ASSISTANT

## 2024-01-17 PROCEDURE — 2500000004 HC RX 250 GENERAL PHARMACY W/ HCPCS (ALT 636 FOR OP/ED): Performed by: NURSE PRACTITIONER

## 2024-01-17 PROCEDURE — 93005 ELECTROCARDIOGRAM TRACING: CPT

## 2024-01-17 PROCEDURE — G0378 HOSPITAL OBSERVATION PER HR: HCPCS

## 2024-01-17 PROCEDURE — 96375 TX/PRO/DX INJ NEW DRUG ADDON: CPT | Performed by: EMERGENCY MEDICINE

## 2024-01-17 PROCEDURE — 82947 ASSAY GLUCOSE BLOOD QUANT: CPT

## 2024-01-17 PROCEDURE — 83735 ASSAY OF MAGNESIUM: CPT | Performed by: PHYSICIAN ASSISTANT

## 2024-01-17 PROCEDURE — 85027 COMPLETE CBC AUTOMATED: CPT | Performed by: PHYSICIAN ASSISTANT

## 2024-01-17 PROCEDURE — 2500000001 HC RX 250 WO HCPCS SELF ADMINISTERED DRUGS (ALT 637 FOR MEDICARE OP): Performed by: NURSE PRACTITIONER

## 2024-01-17 PROCEDURE — 80048 BASIC METABOLIC PNL TOTAL CA: CPT | Performed by: PHYSICIAN ASSISTANT

## 2024-01-17 PROCEDURE — 82947 ASSAY GLUCOSE BLOOD QUANT: CPT | Mod: 91

## 2024-01-17 PROCEDURE — 2500000004 HC RX 250 GENERAL PHARMACY W/ HCPCS (ALT 636 FOR OP/ED): Mod: MUE | Performed by: PHYSICIAN ASSISTANT

## 2024-01-17 PROCEDURE — 82947 ASSAY GLUCOSE BLOOD QUANT: CPT | Mod: 59

## 2024-01-17 PROCEDURE — 80307 DRUG TEST PRSMV CHEM ANLYZR: CPT | Performed by: PHYSICIAN ASSISTANT

## 2024-01-17 PROCEDURE — 2500000001 HC RX 250 WO HCPCS SELF ADMINISTERED DRUGS (ALT 637 FOR MEDICARE OP): Performed by: PHYSICIAN ASSISTANT

## 2024-01-17 PROCEDURE — 99233 SBSQ HOSP IP/OBS HIGH 50: CPT | Performed by: NURSE PRACTITIONER

## 2024-01-17 PROCEDURE — 36415 COLL VENOUS BLD VENIPUNCTURE: CPT | Performed by: PHYSICIAN ASSISTANT

## 2024-01-17 RX ORDER — PRAZOSIN HYDROCHLORIDE 1 MG/1
1 CAPSULE ORAL NIGHTLY
Status: DISCONTINUED | OUTPATIENT
Start: 2024-01-17 | End: 2024-01-18 | Stop reason: HOSPADM

## 2024-01-17 RX ORDER — MELATONIN 1 MG
3 TABLET,CHEWABLE ORAL NIGHTLY
Status: ON HOLD | COMMUNITY
End: 2024-01-17 | Stop reason: ENTERED-IN-ERROR

## 2024-01-17 RX ORDER — CARBIDOPA AND LEVODOPA 25; 100 MG/1; MG/1
1 TABLET ORAL 3 TIMES DAILY
Status: DISCONTINUED | OUTPATIENT
Start: 2024-01-17 | End: 2024-01-17

## 2024-01-17 RX ORDER — ACETAMINOPHEN 325 MG/1
650 TABLET ORAL EVERY 4 HOURS PRN
COMMUNITY

## 2024-01-17 RX ORDER — DIVALPROEX SODIUM 125 MG/1
125 CAPSULE, COATED PELLETS ORAL 2 TIMES DAILY
Status: DISCONTINUED | OUTPATIENT
Start: 2024-01-17 | End: 2024-01-18 | Stop reason: HOSPADM

## 2024-01-17 RX ORDER — CARBIDOPA AND LEVODOPA 25; 100 MG/1; MG/1
2 TABLET ORAL 3 TIMES DAILY
Status: DISCONTINUED | OUTPATIENT
Start: 2024-01-17 | End: 2024-01-18 | Stop reason: HOSPADM

## 2024-01-17 RX ORDER — THIAMINE HYDROCHLORIDE 100 MG/ML
100 INJECTION, SOLUTION INTRAMUSCULAR; INTRAVENOUS ONCE
Status: COMPLETED | OUTPATIENT
Start: 2024-01-17 | End: 2024-01-17

## 2024-01-17 RX ORDER — BUSPIRONE HYDROCHLORIDE 10 MG/1
10 TABLET ORAL 2 TIMES DAILY
Status: DISCONTINUED | OUTPATIENT
Start: 2024-01-17 | End: 2024-01-18 | Stop reason: HOSPADM

## 2024-01-17 RX ORDER — DIVALPROEX SODIUM 125 MG/1
125 CAPSULE, COATED PELLETS ORAL 2 TIMES DAILY
COMMUNITY

## 2024-01-17 RX ORDER — CITALOPRAM 10 MG/1
30 TABLET ORAL DAILY
Status: DISCONTINUED | OUTPATIENT
Start: 2024-01-17 | End: 2024-01-18 | Stop reason: HOSPADM

## 2024-01-17 RX ORDER — DOCUSATE SODIUM 100 MG/1
100 CAPSULE, LIQUID FILLED ORAL 2 TIMES DAILY
COMMUNITY

## 2024-01-17 RX ORDER — VALACYCLOVIR HYDROCHLORIDE 500 MG/1
500 TABLET, FILM COATED ORAL DAILY
COMMUNITY

## 2024-01-17 RX ORDER — LEVETIRACETAM 500 MG/1
500 TABLET ORAL 2 TIMES DAILY
Status: DISCONTINUED | OUTPATIENT
Start: 2024-01-17 | End: 2024-01-18 | Stop reason: HOSPADM

## 2024-01-17 RX ORDER — GABAPENTIN 400 MG/1
800 CAPSULE ORAL 3 TIMES DAILY
Status: DISCONTINUED | OUTPATIENT
Start: 2024-01-17 | End: 2024-01-18 | Stop reason: HOSPADM

## 2024-01-17 RX ORDER — TRAZODONE HYDROCHLORIDE 50 MG/1
200 TABLET ORAL NIGHTLY
Status: DISCONTINUED | OUTPATIENT
Start: 2024-01-17 | End: 2024-01-18 | Stop reason: HOSPADM

## 2024-01-17 RX ORDER — ZOLPIDEM TARTRATE 5 MG/1
2.5 TABLET ORAL NIGHTLY PRN
COMMUNITY

## 2024-01-17 RX ORDER — METOPROLOL TARTRATE 25 MG/1
25 TABLET, FILM COATED ORAL 2 TIMES DAILY
Status: DISCONTINUED | OUTPATIENT
Start: 2024-01-17 | End: 2024-01-18 | Stop reason: HOSPADM

## 2024-01-17 RX ORDER — TRAZODONE HYDROCHLORIDE 100 MG/1
200 TABLET ORAL NIGHTLY
COMMUNITY

## 2024-01-17 RX ORDER — ASPIRIN 81 MG/1
81 TABLET ORAL DAILY
COMMUNITY

## 2024-01-17 RX ORDER — CITALOPRAM 30 MG/1
30 CAPSULE ORAL DAILY
COMMUNITY

## 2024-01-17 RX ORDER — CLONAZEPAM 0.5 MG/1
0.5 TABLET ORAL 3 TIMES DAILY
Status: DISCONTINUED | OUTPATIENT
Start: 2024-01-17 | End: 2024-01-18 | Stop reason: HOSPADM

## 2024-01-17 RX ORDER — PRAZOSIN HYDROCHLORIDE 1 MG/1
1 CAPSULE ORAL NIGHTLY
COMMUNITY

## 2024-01-17 RX ORDER — METOPROLOL TARTRATE 25 MG/1
25 TABLET, FILM COATED ORAL 2 TIMES DAILY
COMMUNITY

## 2024-01-17 RX ORDER — IBUPROFEN 200 MG
1 TABLET ORAL DAILY
COMMUNITY

## 2024-01-17 RX ORDER — POTASSIUM CHLORIDE 20 MEQ/1
40 TABLET, EXTENDED RELEASE ORAL ONCE
Status: COMPLETED | OUTPATIENT
Start: 2024-01-17 | End: 2024-01-17

## 2024-01-17 RX ORDER — CLONAZEPAM 0.5 MG/1
0.5 TABLET ORAL 3 TIMES DAILY
COMMUNITY

## 2024-01-17 RX ADMIN — ATORVASTATIN CALCIUM 40 MG: 40 TABLET, FILM COATED ORAL at 02:00

## 2024-01-17 RX ADMIN — DOCUSATE SODIUM 100 MG: 100 CAPSULE, LIQUID FILLED ORAL at 20:56

## 2024-01-17 RX ADMIN — PANTOPRAZOLE SODIUM 40 MG: 40 TABLET, DELAYED RELEASE ORAL at 06:43

## 2024-01-17 RX ADMIN — LEVETIRACETAM 500 MG: 500 TABLET, FILM COATED ORAL at 20:57

## 2024-01-17 RX ADMIN — BUSPIRONE HYDROCHLORIDE 10 MG: 10 TABLET ORAL at 10:35

## 2024-01-17 RX ADMIN — ATORVASTATIN CALCIUM 40 MG: 40 TABLET, FILM COATED ORAL at 20:56

## 2024-01-17 RX ADMIN — CARBIDOPA AND LEVODOPA 2 TABLET: 25; 100 TABLET ORAL at 17:10

## 2024-01-17 RX ADMIN — ENOXAPARIN SODIUM 40 MG: 40 INJECTION SUBCUTANEOUS at 23:00

## 2024-01-17 RX ADMIN — DOCUSATE SODIUM 100 MG: 100 CAPSULE, LIQUID FILLED ORAL at 02:00

## 2024-01-17 RX ADMIN — INSULIN LISPRO 1 UNITS: 100 INJECTION, SOLUTION INTRAVENOUS; SUBCUTANEOUS at 20:57

## 2024-01-17 RX ADMIN — ENOXAPARIN SODIUM 40 MG: 40 INJECTION SUBCUTANEOUS at 05:57

## 2024-01-17 RX ADMIN — THIAMINE HYDROCHLORIDE 100 MG: 100 INJECTION, SOLUTION INTRAMUSCULAR; INTRAVENOUS at 17:21

## 2024-01-17 RX ADMIN — DIVALPROEX SODIUM 125 MG: 125 CAPSULE, COATED PELLETS ORAL at 10:35

## 2024-01-17 RX ADMIN — GABAPENTIN 800 MG: 400 CAPSULE ORAL at 10:30

## 2024-01-17 RX ADMIN — DIVALPROEX SODIUM 125 MG: 125 CAPSULE, COATED PELLETS ORAL at 20:56

## 2024-01-17 RX ADMIN — GABAPENTIN 800 MG: 400 CAPSULE ORAL at 17:14

## 2024-01-17 RX ADMIN — CLONAZEPAM 0.5 MG: 0.5 TABLET ORAL at 17:11

## 2024-01-17 RX ADMIN — CARBIDOPA AND LEVODOPA 1 TABLET: 25; 100 TABLET ORAL at 09:00

## 2024-01-17 RX ADMIN — BUSPIRONE HYDROCHLORIDE 10 MG: 10 TABLET ORAL at 20:56

## 2024-01-17 RX ADMIN — CARBIDOPA AND LEVODOPA 1 TABLET: 25; 100 TABLET ORAL at 10:27

## 2024-01-17 RX ADMIN — ASPIRIN 81 MG: 81 TABLET, COATED ORAL at 10:29

## 2024-01-17 RX ADMIN — POTASSIUM CHLORIDE 40 MEQ: 1500 TABLET, EXTENDED RELEASE ORAL at 10:28

## 2024-01-17 RX ADMIN — CLONAZEPAM 0.5 MG: 0.5 TABLET ORAL at 09:45

## 2024-01-17 RX ADMIN — METOPROLOL TARTRATE 25 MG: 25 TABLET, FILM COATED ORAL at 20:57

## 2024-01-17 RX ADMIN — LEVETIRACETAM 500 MG: 500 TABLET, FILM COATED ORAL at 10:28

## 2024-01-17 RX ADMIN — METOPROLOL TARTRATE 25 MG: 25 TABLET, FILM COATED ORAL at 17:22

## 2024-01-17 RX ADMIN — GABAPENTIN 800 MG: 400 CAPSULE ORAL at 20:57

## 2024-01-17 RX ADMIN — CLONAZEPAM 0.5 MG: 0.5 TABLET ORAL at 20:56

## 2024-01-17 RX ADMIN — TRAZODONE HYDROCHLORIDE 200 MG: 50 TABLET ORAL at 20:57

## 2024-01-17 RX ADMIN — DOCUSATE SODIUM 100 MG: 100 CAPSULE, LIQUID FILLED ORAL at 10:28

## 2024-01-17 RX ADMIN — CARBIDOPA AND LEVODOPA 2 TABLET: 25; 100 TABLET ORAL at 20:56

## 2024-01-17 RX ADMIN — ASPIRIN 81 MG: 81 TABLET, COATED ORAL at 02:00

## 2024-01-17 RX ADMIN — CITALOPRAM HYDROBROMIDE 30 MG: 10 TABLET ORAL at 10:35

## 2024-01-17 RX ADMIN — PRAZOSIN HYDROCHLORIDE 1 MG: 1 CAPSULE ORAL at 20:57

## 2024-01-17 SDOH — SOCIAL STABILITY: SOCIAL INSECURITY: WERE YOU ABLE TO COMPLETE ALL THE BEHAVIORAL HEALTH SCREENINGS?: YES

## 2024-01-17 SDOH — SOCIAL STABILITY: SOCIAL INSECURITY: DO YOU FEEL ANYONE HAS EXPLOITED OR TAKEN ADVANTAGE OF YOU FINANCIALLY OR OF YOUR PERSONAL PROPERTY?: NO

## 2024-01-17 SDOH — SOCIAL STABILITY: SOCIAL INSECURITY: DO YOU FEEL UNSAFE GOING BACK TO THE PLACE WHERE YOU ARE LIVING?: NO

## 2024-01-17 SDOH — SOCIAL STABILITY: SOCIAL INSECURITY: ABUSE: ADULT

## 2024-01-17 SDOH — SOCIAL STABILITY: SOCIAL INSECURITY: ARE THERE ANY APPARENT SIGNS OF INJURIES/BEHAVIORS THAT COULD BE RELATED TO ABUSE/NEGLECT?: NO

## 2024-01-17 SDOH — SOCIAL STABILITY: SOCIAL INSECURITY: HAS ANYONE EVER THREATENED TO HURT YOUR FAMILY OR YOUR PETS?: NO

## 2024-01-17 SDOH — SOCIAL STABILITY: SOCIAL INSECURITY: HAVE YOU HAD THOUGHTS OF HARMING ANYONE ELSE?: NO

## 2024-01-17 SDOH — SOCIAL STABILITY: SOCIAL INSECURITY: DOES ANYONE TRY TO KEEP YOU FROM HAVING/CONTACTING OTHER FRIENDS OR DOING THINGS OUTSIDE YOUR HOME?: NO

## 2024-01-17 SDOH — SOCIAL STABILITY: SOCIAL INSECURITY: ARE YOU OR HAVE YOU BEEN THREATENED OR ABUSED PHYSICALLY, EMOTIONALLY, OR SEXUALLY BY ANYONE?: NO

## 2024-01-17 ASSESSMENT — COLUMBIA-SUICIDE SEVERITY RATING SCALE - C-SSRS
1. IN THE PAST MONTH, HAVE YOU WISHED YOU WERE DEAD OR WISHED YOU COULD GO TO SLEEP AND NOT WAKE UP?: NO
2. HAVE YOU ACTUALLY HAD ANY THOUGHTS OF KILLING YOURSELF?: NO
6. HAVE YOU EVER DONE ANYTHING, STARTED TO DO ANYTHING, OR PREPARED TO DO ANYTHING TO END YOUR LIFE?: NO

## 2024-01-17 ASSESSMENT — LIFESTYLE VARIABLES
HOW OFTEN DO YOU HAVE A DRINK CONTAINING ALCOHOL: NEVER
HOW MANY STANDARD DRINKS CONTAINING ALCOHOL DO YOU HAVE ON A TYPICAL DAY: PATIENT DOES NOT DRINK
HOW OFTEN DO YOU HAVE 6 OR MORE DRINKS ON ONE OCCASION: NEVER
AUDIT-C TOTAL SCORE: 0
AUDIT-C TOTAL SCORE: 0
SKIP TO QUESTIONS 9-10: 1

## 2024-01-17 ASSESSMENT — ACTIVITIES OF DAILY LIVING (ADL)
HEARING - RIGHT EAR: FUNCTIONAL
FEEDING YOURSELF: NEEDS ASSISTANCE
LACK_OF_TRANSPORTATION: NO
WALKS IN HOME: DEPENDENT
BATHING: NEEDS ASSISTANCE
TOILETING: DEPENDENT
HEARING - LEFT EAR: FUNCTIONAL
DRESSING YOURSELF: NEEDS ASSISTANCE
GROOMING: NEEDS ASSISTANCE
LACK_OF_TRANSPORTATION: NO
PATIENT'S MEMORY ADEQUATE TO SAFELY COMPLETE DAILY ACTIVITIES?: NO
JUDGMENT_ADEQUATE_SAFELY_COMPLETE_DAILY_ACTIVITIES: NO
ADEQUATE_TO_COMPLETE_ADL: YES

## 2024-01-17 ASSESSMENT — ENCOUNTER SYMPTOMS
WEAKNESS: 1
FEVER: 0
COUGH: 0
FACIAL ASYMMETRY: 0
DYSURIA: 0
NECK PAIN: 0
HEMATURIA: 0
NECK STIFFNESS: 0
HALLUCINATIONS: 0
MYALGIAS: 0
CHILLS: 0
SHORTNESS OF BREATH: 0
VOMITING: 0
DIARRHEA: 0
NAUSEA: 0
SPEECH DIFFICULTY: 1
EYE PAIN: 0
SORE THROAT: 0

## 2024-01-17 ASSESSMENT — PAIN - FUNCTIONAL ASSESSMENT: PAIN_FUNCTIONAL_ASSESSMENT: 0-10

## 2024-01-17 ASSESSMENT — PATIENT HEALTH QUESTIONNAIRE - PHQ9
1. LITTLE INTEREST OR PLEASURE IN DOING THINGS: NOT AT ALL
2. FEELING DOWN, DEPRESSED OR HOPELESS: NOT AT ALL
SUM OF ALL RESPONSES TO PHQ9 QUESTIONS 1 & 2: 0

## 2024-01-17 ASSESSMENT — PAIN SCALES - GENERAL: PAINLEVEL_OUTOF10: 0 - NO PAIN

## 2024-01-17 ASSESSMENT — COGNITIVE AND FUNCTIONAL STATUS - GENERAL: PATIENT BASELINE BEDBOUND: YES

## 2024-01-17 NOTE — H&P
History Of Present Illness  Darwin Castro is a 68 y.o. 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 Denies fever, chills, SOB, CP, N/V/D, dysuria. HPI limited due to patient's chronic aphasia from stroke. Pt's brother is no longer at bedside to provide additional information, majority of history obtained via chart review. Brother present in ED and stated he spoke with nursing staff and they noticed the pt was lethargic appearing at around 3pm but are unsure when she was seen prior to that. Family spoke with patient on the phone yesterday and stated she seemed to be at her baseline mental status at the time.       ED course: Lab work showed CBC without leukocytosis, CMP with low potassium at 3.3. Troponin normal. Magnesium normal. PT-INR normal. TSH levels normal. UA showed +protein, +ketones. Positive COVID PCR. CT brain showed no acute findings with stable intracranial findings since comparison head CT 02/08/2023. CTA negative. CXR showed mild left basilar infiltrate or atelectasis and probable small pleural effusion. EKG showed no ischemic ST changes.      Past Medical History  History reviewed. No pertinent past medical history.      Surgical History  Past Surgical History:   Procedure Laterality Date    OTHER SURGICAL HISTORY  10/15/2019    Brain surgery       Social History  Social History     Socioeconomic History    Marital status:      Spouse name: None    Number of children: None    Years of education: None    Highest education level: None   Occupational History    None   Tobacco Use    Smoking status: Never    Smokeless tobacco: Never   Substance and Sexual Activity    Alcohol use: Never    Drug use: Never    Sexual activity: None   Other Topics Concern    None   Social History Narrative    None     Social Determinants of Health      Financial Resource Strain: Patient Unable To Answer (1/17/2024)    Overall Financial Resource Strain (CARDIA)     Difficulty of Paying Living Expenses: Patient unable to answer   Food Insecurity: Not on file   Transportation Needs: No Transportation Needs (1/17/2024)    PRAPARE - Transportation     Lack of Transportation (Medical): No     Lack of Transportation (Non-Medical): No   Physical Activity: Not on file   Stress: Not on file   Social Connections: Not on file   Intimate Partner Violence: Not on file   Housing Stability: Unknown (1/17/2024)    Housing Stability Vital Sign     Unable to Pay for Housing in the Last Year: Patient unable to answer     Number of Places Lived in the Last Year: 1     Unstable Housing in the Last Year: No        Family History  No family history on file.     Allergies  Allergies as of 01/16/2024 - Reviewed 01/16/2024   Allergen Reaction Noted    Penicillins Unknown 11/17/2023        Review of Systems  Review of Systems   Constitutional:  Negative for chills and fever.   HENT:  Negative for ear pain and sore throat.    Eyes:  Negative for pain.   Respiratory:  Negative for cough and shortness of breath.    Cardiovascular:  Negative for chest pain.   Gastrointestinal:  Negative for diarrhea, nausea and vomiting.   Genitourinary:  Negative for dysuria and hematuria.   Musculoskeletal:  Negative for myalgias, neck pain and neck stiffness.   Skin:  Negative for rash.   Neurological:  Positive for speech difficulty and weakness. Negative for facial asymmetry.        Chronic aphasia and R sided weakness from prior CVA   Psychiatric/Behavioral:  Negative for hallucinations.    All other systems reviewed and are negative.      Relevant results reviewed   PROVIDER notes  Nursing notes  MEDS:  Current Facility-Administered Medications   Medication Dose Route Frequency Provider Last Rate Last Admin    acetaminophen (Tylenol) tablet 975 mg  975 mg oral q6h PRN Fariha Smith PA-C         aspirin EC tablet 81 mg  81 mg oral Daily Fariha Smith PA-C        atorvastatin (Lipitor) tablet 40 mg  40 mg oral Nightly Fariha Smith PA-C        benzonatate (Tessalon) capsule 100 mg  100 mg oral TID PRN Fariha Smith PA-C        dextrose 10 % in water (D10W) infusion  30 mL/hr intravenous Once PRN Fariha Smith PA-C        dextrose 50 % injection 25 g  25 g intravenous q15 min PRN Fariha Smith PA-C        docusate sodium (Colace) capsule 100 mg  100 mg oral BID Fariha Smith PA-C        enoxaparin (Lovenox) syringe 40 mg  40 mg subcutaneous q24h Fariha Smith PA-C        glucagon (Glucagen) injection 1 mg  1 mg intramuscular q15 min PRN Fariha Smith PA-C        insulin lispro (HumaLOG) injection 0-5 Units  0-5 Units subcutaneous Before meals & nightly Fariha Smith PA-C        pantoprazole (ProtoNix) EC tablet 40 mg  40 mg oral Daily before breakfast Fariha Smith PA-C          LABS:  Results for orders placed or performed during the hospital encounter of 01/16/24 (from the past 24 hour(s))   Comprehensive metabolic panel   Result Value Ref Range    Glucose 86 74 - 99 mg/dL    Sodium 137 136 - 145 mmol/L    Potassium 3.3 (L) 3.5 - 5.3 mmol/L    Chloride 99 98 - 107 mmol/L    Bicarbonate 30 21 - 32 mmol/L    Anion Gap 11 10 - 20 mmol/L    Urea Nitrogen 9 6 - 23 mg/dL    Creatinine 0.86 0.50 - 1.05 mg/dL    eGFR 74 >60 mL/min/1.73m*2    Calcium 8.9 8.6 - 10.3 mg/dL    Albumin 3.9 3.4 - 5.0 g/dL    Alkaline Phosphatase 61 33 - 136 U/L    Total Protein 7.3 6.4 - 8.2 g/dL    AST 22 9 - 39 U/L    Bilirubin, Total 0.4 0.0 - 1.2 mg/dL    ALT <3 (L) 7 - 45 U/L   Troponin I, High Sensitivity   Result Value Ref Range    Troponin I, High Sensitivity 10 0 - 13 ng/L   CBC and Auto Differential   Result Value Ref Range    WBC 6.7 4.4 - 11.3 x10*3/uL    nRBC 0.0 0.0 - 0.0 /100 WBCs    RBC 4.44 4.00 - 5.20 x10*6/uL    Hemoglobin 13.3 12.0 - 16.0 g/dL    Hematocrit 41.9 36.0 - 46.0 %    MCV 94 80  - 100 fL    MCH 30.0 26.0 - 34.0 pg    MCHC 31.7 (L) 32.0 - 36.0 g/dL    RDW 13.0 11.5 - 14.5 %    Platelets 158 150 - 450 x10*3/uL    Neutrophils % 49.7 40.0 - 80.0 %    Immature Granulocytes %, Automated 0.3 0.0 - 0.9 %    Lymphocytes % 35.3 13.0 - 44.0 %    Monocytes % 14.2 2.0 - 10.0 %    Eosinophils % 0.1 0.0 - 6.0 %    Basophils % 0.4 0.0 - 2.0 %    Neutrophils Absolute 3.33 1.20 - 7.70 x10*3/uL    Immature Granulocytes Absolute, Automated 0.02 0.00 - 0.70 x10*3/uL    Lymphocytes Absolute 2.37 1.20 - 4.80 x10*3/uL    Monocytes Absolute 0.95 0.10 - 1.00 x10*3/uL    Eosinophils Absolute 0.01 0.00 - 0.70 x10*3/uL    Basophils Absolute 0.03 0.00 - 0.10 x10*3/uL   Magnesium   Result Value Ref Range    Magnesium 1.60 1.60 - 2.40 mg/dL   Protime-INR   Result Value Ref Range    Protime 12.4 9.8 - 12.8 seconds    INR 1.1 0.9 - 1.1   TSH with reflex to Free T4 if abnormal   Result Value Ref Range    Thyroid Stimulating Hormone 0.90 0.44 - 3.98 mIU/L   POCT GLUCOSE   Result Value Ref Range    POCT Glucose 94 74 - 99 mg/dL   Hemoglobin A1C   Result Value Ref Range    Hemoglobin A1C 5.8 (H) see below %    Estimated Average Glucose 120 Not Established mg/dL   Lipid Panel   Result Value Ref Range    Cholesterol 173 0 - 199 mg/dL    HDL-Cholesterol 61.6 mg/dL    Cholesterol/HDL Ratio 2.8     LDL Calculated 100 (H) <=99 mg/dL    VLDL 11 0 - 40 mg/dL    Triglycerides 55 0 - 149 mg/dL    Non HDL Cholesterol 111 0 - 149 mg/dL   Urinalysis with Reflex Microscopic   Result Value Ref Range    Color, Urine Yellow Straw, Yellow    Appearance, Urine Clear Clear    Specific Gravity, Urine 1.060 (N) 1.005 - 1.035    pH, Urine 5.0 5.0, 5.5, 6.0, 6.5, 7.0, 7.5, 8.0    Protein, Urine 30 (1+) (N) NEGATIVE mg/dL    Glucose, Urine NEGATIVE NEGATIVE mg/dL    Blood, Urine NEGATIVE NEGATIVE    Ketones, Urine 20 (1+) (A) NEGATIVE mg/dL    Bilirubin, Urine NEGATIVE NEGATIVE    Urobilinogen, Urine 2.0 (N) <2.0 mg/dL    Nitrite, Urine NEGATIVE  NEGATIVE    Leukocyte Esterase, Urine NEGATIVE NEGATIVE   Microscopic Only, Urine   Result Value Ref Range    WBC, Urine 1-5 1-5, NONE /HPF    RBC, Urine 1-2 NONE, 1-2, 3-5 /HPF    Squamous Epithelial Cells, Urine 1-9 (SPARSE) Reference range not established. /HPF    Mucus, Urine 2+ Reference range not established. /LPF   Sars-CoV-2 and Influenza A/B PCR   Result Value Ref Range    Flu A Result Not Detected Not Detected    Flu B Result Not Detected Not Detected    Coronavirus 2019, PCR Detected (A) Not Detected   Levetiracetam   Result Value Ref Range    Keppra 32 10 - 40 ug/mL   Magnesium   Result Value Ref Range    Magnesium 1.70 1.60 - 2.40 mg/dL   CBC   Result Value Ref Range    WBC 5.5 4.4 - 11.3 x10*3/uL    nRBC 0.0 0.0 - 0.0 /100 WBCs    RBC 4.39 4.00 - 5.20 x10*6/uL    Hemoglobin 13.2 12.0 - 16.0 g/dL    Hematocrit 43.0 36.0 - 46.0 %    MCV 98 80 - 100 fL    MCH 30.1 26.0 - 34.0 pg    MCHC 30.7 (L) 32.0 - 36.0 g/dL    RDW 13.1 11.5 - 14.5 %    Platelets 123 (L) 150 - 450 x10*3/uL   Basic metabolic panel   Result Value Ref Range    Glucose 75 74 - 99 mg/dL    Sodium 142 136 - 145 mmol/L    Potassium 3.4 (L) 3.5 - 5.3 mmol/L    Chloride 105 98 - 107 mmol/L    Bicarbonate 28 21 - 32 mmol/L    Anion Gap 12 10 - 20 mmol/L    Urea Nitrogen 6 6 - 23 mg/dL    Creatinine 0.73 0.50 - 1.05 mg/dL    eGFR 90 >60 mL/min/1.73m*2    Calcium 8.8 8.6 - 10.3 mg/dL      IMAGING:  XR chest 1 view   Final Result   Mild left basilar infiltrate or atelectasis and probable small pleural   effusion.   Signed by Manav Porter MD      CT angio brain attack head w IV contrast and post procedure   Final Result   No evidence for significant stenosis or large branch vessel cutoffs   of the major extracranial or intracranial arterial vasculature   attenuation of a left M2 branch extending towards the region of left   frontal lobe encephalomalacia. MRI would be more sensitive and   specific if recent ischemia were clinically suspected..       "  MACRO:   None        Signed by: Gabriel Henry 1/16/2024 7:01 PM   Dictation workstation:   [id number]      CT angio brain attack neck w IV contrast and post procedure   Final Result   No evidence for significant stenosis or large branch vessel cutoffs   of the major extracranial or intracranial arterial vasculature   attenuation of a left M2 branch extending towards the region of left   frontal lobe encephalomalacia. MRI would be more sensitive and   specific if recent ischemia were clinically suspected..        MACRO:   None        Signed by: Gabriel Henry 1/16/2024 7:01 PM   Dictation workstation:   [id number]      CT brain attack head wo IV contrast   Final Result   No evidence of acute cortical infarct or intracranial hemorrhage.        Stable intracranial findings since comparison head CT 02/08/2023.        MACRO:   Antony Dixon discussed the significance and urgency of this critical   finding by telephone with  BEV MATAMOROS on 1/16/2024 at 6:08 pm.   (**-RCF-**) Findings:  See findings.                  Signed by: Antony Dixon 1/16/2024 6:09 PM   Dictation workstation:   [id number]             PHYSICAL EXAM  /64 (BP Location: Left arm, Patient Position: Lying)   Pulse 89   Temp 36.6 °C (97.9 °F) (Oral)   Resp 17   Ht 1.549 m (5' 0.98\")   Wt 82.5 kg (181 lb 14.1 oz)   SpO2 96%   BMI 34.38 kg/m²   PHYSICAL EXAM:  GENERAL: Alert, NAD, cooperative  SKIN: Warm and dry.  No suspicious lesions or rashes.  HEENT:  NCAT, PERRLA, EOMI, nonicteric sclera, MMM, neck supple, trachea midline  LUNGS: Unlabored, CTAB, no significant wheezing, rhonchi, or rales appreciated  CARDS: RRR  GI: Soft, NTND, BS+, no rebound, no guarding   : no pena, voids independently   MS/Extremities: WWP, no edema, distal pulses intact, Chronic R sided weakness   PSYCH: mood and behavior appropriate    Neuro:  Speech: Dysnomia, dysfluent speech, but can follow most commands, no dysarthria   Cranial Nerves  II/III- PERRL, full " visual fields  III/IV/VI- EOMI, no nystagmus  V- Face symmetric  VII- hearing intact bilaterally  IX/X- symmetric palate elevation  XI- 5/5 sternocleidomastoid motor strength  XII- tongue midline  Motor exam- Diffuse right arm weakness, chronic R sided weakness at baseline  Coordination- No ataxia with finger to nose  Sensory exam- SILT throughout  Reflexes- symmetric Plantar responses are flexor.      Assessment/Plan:   Principal Problem:    TIA (transient ischemic attack)  Active Problems:    History of DVT (deep vein thrombosis)    Aphasia following cerebrovascular accident (CVA)    Essential (primary) hypertension    Hemiparesis (CMS/HCC)    Parkinson's disease    Partial symptomatic epilepsy with complex partial seizures, not intractable, without status epilepticus (CMS/HCC)    COVID-19     TIA/CVA r/o  Aphasia following CVA  Hemiparesis  Partial symptomatic epilepsy  -CBC: no leukocytosis, no acute anemia, no thrombocytopenia  -CMP: no significant electrolyte abnormalities, no acute renal/liver dysfunction appreciated   -CTH: No evidence of acute cortical infarct or intracranial hemorrhage. Stable intracranial findings since comparison head CT 02/08/202  -CTA: negative  -EKG: non-ischemic  -HS CHELLE: normal  -TSH: normal  -Magnesium: normal  -placed tia/stroke care path --> neuro checks as appropriate   -monitor on tele   -doubt CVA currently at baseline, suspect transient weakness/imbalance 2/2 COVID --> discussed with Neurology who did see the patient and agrees patient appears at baseline, recommended MRI brain --> no indication for ECHO or full neurology consult at this time  -MRI brain w/o contrast  -ASA/statin  -Hba1c/lipid panel  -optimize glycemic control  -IV Keppra given - lab levels normal  -Continue home meds as appropriate    COVID-19  -Positive COVID PCR  -Supportive care    GI prophylaxis   -Protonix. Bowel regimen.     VTE prophylaxis  -Lovenox  -SCDs    Nina Brown  Not finalized until  co-signed    I was present with a PA student who participated in the documentation of this note. I personally saw and evaluated the patient and performed my own history and examination.  I discussed the case with the PA student. I have reviewed, verified, and revised the note as necessary and agree with the content and plan as written by the PA student, addendums noted in above in bold as appropriate.     ACE SanchezC    Total time spent [including but not limited to]: Obtaining and reviewing patient medical records/history, obtaining a separate history,  examining and assessing the patient, providing  and education, placing pertinent orders for labs/tests/medications,  communicating with the patient/family/health team, documenting in the patient's EMR/formulation of this note, independently interpreting results and data, coordinating care:   55 minutes, with greater than 50% spent in personal discussion with patient and/or family

## 2024-01-17 NOTE — PROGRESS NOTES
Pharmacy Medication History Review    Darwin Castro is a 68 y.o. female admitted for TIA (transient ischemic attack). Pharmacy reviewed the patient's [id number] medications and allergies for accuracy.    Below are additional concerns with the patient's PTA list.   List received from [facility] where this patient is currently a resident    The list below reflectives the updated PTA list. Please review each medication in order reconciliation for additional clarification and justification.  Medications Prior to Admission   Medication Sig Dispense Refill Last Dose    acetaminophen (Tylenol) 325 mg tablet Take 2 tablets (650 mg) by mouth every 4 hours if needed for mild pain (1 - 3).   1/16/2024    aspirin 81 mg EC tablet Take 1 tablet (81 mg) by mouth once daily.   1/16/2024    busPIRone (Buspar) 10 mg tablet Take by mouth twice a day.   1/16/2024    carbidopa-levodopa (Sinemet)  mg tablet Take 2 tablets by mouth 3 times a day.   1/16/2024    cholecalciferol (Vitamin D-3) 25 MCG (1000 UT) capsule Take 2 capsules (50 mcg) by mouth once daily.   1/16/2024    citalopram 30 mg capsule Take 30 mg by mouth once daily.   1/16/2024    clonazePAM (KlonoPIN) 0.5 mg tablet Take 1 tablet (0.5 mg) by mouth 3 times a day.   1/16/2024    divalproex sprinkle (Depakote Sprinkle) 125 mg DR capsule Take 1 capsule (125 mg) by mouth 2 times a day.       docusate sodium (Colace) 100 mg capsule Take 1 capsule (100 mg) by mouth 2 times a day.   Past Week    gabapentin (Neurontin) 800 mg tablet Take 1 tablet (800 mg) by mouth 3 times a day.   1/16/2024    lactobacillus acidophilus (Acidophilus) capsule Take 1 capsule by mouth once daily.   1/16/2024    levETIRAcetam (Keppra) 500 mg tablet Take 1 tablet (500 mg) by mouth 2 times a day.   1/16/2024    metoprolol tartrate (Lopressor) 25 mg tablet Take 1 tablet (25 mg) by mouth 2 times a day.   1/16/2024    prazosin (Minipress) 1 mg capsule Take 1 capsule (1 mg) by  mouth once daily at bedtime.   Past Week    traZODone (Desyrel) 100 mg tablet Take 2 tablets (200 mg) by mouth once daily at bedtime.   Past Week    valACYclovir (Valtrex) 500 mg tablet Take 1 tablet (500 mg) by mouth once daily.   1/16/2024    zolpidem (Ambien) 5 mg tablet Take 0.5 tablets (2.5 mg) by mouth as needed at bedtime for sleep.   Past Week                Jyothi Webb, PharmD

## 2024-01-17 NOTE — PROGRESS NOTES
"Darwin Castro is a 68 y.o. female on day 0 of admission presenting with TIA (transient ischemic attack).    Subjective   Resting in bed, aphasic. Does have some tremors noted        Objective     Physical Exam  Constitutional:       Appearance: Normal appearance.   Cardiovascular:      Rate and Rhythm: Normal rate and regular rhythm.      Pulses: Normal pulses.      Heart sounds: Normal heart sounds. No murmur heard.     No gallop.   Pulmonary:      Effort: Pulmonary effort is normal. No respiratory distress.      Breath sounds: Normal breath sounds. No wheezing or rhonchi.   Abdominal:      General: Abdomen is flat. There is no distension.      Palpations: Abdomen is soft.      Tenderness: There is no abdominal tenderness. There is no guarding.   Skin:     General: Skin is warm.      Capillary Refill: Capillary refill takes less than 2 seconds.   Neurological:      Mental Status: She is alert. Mental status is at baseline.      Cranial Nerves: Dysarthria present.      Motor: Weakness and tremor present.   Psychiatric:         Mood and Affect: Mood normal.         Thought Content: Thought content normal.         Last Recorded Vitals  Blood pressure 137/64, pulse 89, temperature 36.6 °C (97.9 °F), temperature source Oral, resp. rate 17, height 1.549 m (5' 0.98\"), weight 82.5 kg (181 lb 14.1 oz), SpO2 96 %.  Intake/Output last 3 Shifts:  No intake/output data recorded.    Relevant Results  Scheduled medications  aspirin, 81 mg, oral, Daily  atorvastatin, 40 mg, oral, Nightly  docusate sodium, 100 mg, oral, BID  enoxaparin, 40 mg, subcutaneous, q24h  insulin lispro, 0-5 Units, subcutaneous, Before meals & nightly  pantoprazole, 40 mg, oral, Daily before breakfast      Continuous medications     PRN medications  PRN medications: acetaminophen, benzonatate, dextrose 10 % in water (D10W), dextrose, glucagon    Results for orders placed or performed during the hospital encounter of 01/16/24 (from the past 24 hour(s)) "   Comprehensive metabolic panel   Result Value Ref Range    Glucose 86 74 - 99 mg/dL    Sodium 137 136 - 145 mmol/L    Potassium 3.3 (L) 3.5 - 5.3 mmol/L    Chloride 99 98 - 107 mmol/L    Bicarbonate 30 21 - 32 mmol/L    Anion Gap 11 10 - 20 mmol/L    Urea Nitrogen 9 6 - 23 mg/dL    Creatinine 0.86 0.50 - 1.05 mg/dL    eGFR 74 >60 mL/min/1.73m*2    Calcium 8.9 8.6 - 10.3 mg/dL    Albumin 3.9 3.4 - 5.0 g/dL    Alkaline Phosphatase 61 33 - 136 U/L    Total Protein 7.3 6.4 - 8.2 g/dL    AST 22 9 - 39 U/L    Bilirubin, Total 0.4 0.0 - 1.2 mg/dL    ALT <3 (L) 7 - 45 U/L   Troponin I, High Sensitivity   Result Value Ref Range    Troponin I, High Sensitivity 10 0 - 13 ng/L   CBC and Auto Differential   Result Value Ref Range    WBC 6.7 4.4 - 11.3 x10*3/uL    nRBC 0.0 0.0 - 0.0 /100 WBCs    RBC 4.44 4.00 - 5.20 x10*6/uL    Hemoglobin 13.3 12.0 - 16.0 g/dL    Hematocrit 41.9 36.0 - 46.0 %    MCV 94 80 - 100 fL    MCH 30.0 26.0 - 34.0 pg    MCHC 31.7 (L) 32.0 - 36.0 g/dL    RDW 13.0 11.5 - 14.5 %    Platelets 158 150 - 450 x10*3/uL    Neutrophils % 49.7 40.0 - 80.0 %    Immature Granulocytes %, Automated 0.3 0.0 - 0.9 %    Lymphocytes % 35.3 13.0 - 44.0 %    Monocytes % 14.2 2.0 - 10.0 %    Eosinophils % 0.1 0.0 - 6.0 %    Basophils % 0.4 0.0 - 2.0 %    Neutrophils Absolute 3.33 1.20 - 7.70 x10*3/uL    Immature Granulocytes Absolute, Automated 0.02 0.00 - 0.70 x10*3/uL    Lymphocytes Absolute 2.37 1.20 - 4.80 x10*3/uL    Monocytes Absolute 0.95 0.10 - 1.00 x10*3/uL    Eosinophils Absolute 0.01 0.00 - 0.70 x10*3/uL    Basophils Absolute 0.03 0.00 - 0.10 x10*3/uL   Magnesium   Result Value Ref Range    Magnesium 1.60 1.60 - 2.40 mg/dL   Protime-INR   Result Value Ref Range    Protime 12.4 9.8 - 12.8 seconds    INR 1.1 0.9 - 1.1   TSH with reflex to Free T4 if abnormal   Result Value Ref Range    Thyroid Stimulating Hormone 0.90 0.44 - 3.98 mIU/L   POCT GLUCOSE   Result Value Ref Range    POCT Glucose 94 74 - 99 mg/dL    Hemoglobin A1C   Result Value Ref Range    Hemoglobin A1C 5.8 (H) see below %    Estimated Average Glucose 120 Not Established mg/dL   Lipid Panel   Result Value Ref Range    Cholesterol 173 0 - 199 mg/dL    HDL-Cholesterol 61.6 mg/dL    Cholesterol/HDL Ratio 2.8     LDL Calculated 100 (H) <=99 mg/dL    VLDL 11 0 - 40 mg/dL    Triglycerides 55 0 - 149 mg/dL    Non HDL Cholesterol 111 0 - 149 mg/dL   Urinalysis with Reflex Microscopic   Result Value Ref Range    Color, Urine Yellow Straw, Yellow    Appearance, Urine Clear Clear    Specific Gravity, Urine 1.060 (N) 1.005 - 1.035    pH, Urine 5.0 5.0, 5.5, 6.0, 6.5, 7.0, 7.5, 8.0    Protein, Urine 30 (1+) (N) NEGATIVE mg/dL    Glucose, Urine NEGATIVE NEGATIVE mg/dL    Blood, Urine NEGATIVE NEGATIVE    Ketones, Urine 20 (1+) (A) NEGATIVE mg/dL    Bilirubin, Urine NEGATIVE NEGATIVE    Urobilinogen, Urine 2.0 (N) <2.0 mg/dL    Nitrite, Urine NEGATIVE NEGATIVE    Leukocyte Esterase, Urine NEGATIVE NEGATIVE   Microscopic Only, Urine   Result Value Ref Range    WBC, Urine 1-5 1-5, NONE /HPF    RBC, Urine 1-2 NONE, 1-2, 3-5 /HPF    Squamous Epithelial Cells, Urine 1-9 (SPARSE) Reference range not established. /HPF    Mucus, Urine 2+ Reference range not established. /LPF   Sars-CoV-2 and Influenza A/B PCR   Result Value Ref Range    Flu A Result Not Detected Not Detected    Flu B Result Not Detected Not Detected    Coronavirus 2019, PCR Detected (A) Not Detected   Levetiracetam   Result Value Ref Range    Keppra 32 10 - 40 ug/mL   Magnesium   Result Value Ref Range    Magnesium 1.70 1.60 - 2.40 mg/dL   CBC   Result Value Ref Range    WBC 5.5 4.4 - 11.3 x10*3/uL    nRBC 0.0 0.0 - 0.0 /100 WBCs    RBC 4.39 4.00 - 5.20 x10*6/uL    Hemoglobin 13.2 12.0 - 16.0 g/dL    Hematocrit 43.0 36.0 - 46.0 %    MCV 98 80 - 100 fL    MCH 30.1 26.0 - 34.0 pg    MCHC 30.7 (L) 32.0 - 36.0 g/dL    RDW 13.1 11.5 - 14.5 %    Platelets 123 (L) 150 - 450 x10*3/uL   Basic metabolic panel   Result  Value Ref Range    Glucose 75 74 - 99 mg/dL    Sodium 142 136 - 145 mmol/L    Potassium 3.4 (L) 3.5 - 5.3 mmol/L    Chloride 105 98 - 107 mmol/L    Bicarbonate 28 21 - 32 mmol/L    Anion Gap 12 10 - 20 mmol/L    Urea Nitrogen 6 6 - 23 mg/dL    Creatinine 0.73 0.50 - 1.05 mg/dL    eGFR 90 >60 mL/min/1.73m*2    Calcium 8.8 8.6 - 10.3 mg/dL       XR chest 1 view    Result Date: 1/16/2024  STUDY: Chest Radiograph;  01/16/2024 7:21 PM INDICATION: Altered mental status.  COMPARISON: CT chest 09/14/2021.  ACCESSION NUMBER(S): [id number] ORDERING CLINICIAN: BEV MATAMOROS TECHNIQUE:  Frontal chest was obtained at 1915 hours. FINDINGS: CARDIOMEDIASTINAL SILHOUETTE: Cardiomediastinal silhouette is normal in size and configuration.  LUNGS: Mild left basilar infiltrate or atelectasis with probable small left pleural effusion.  ABDOMEN: No remarkable upper abdominal findings.  BONES: No acute osseous changes.    Mild left basilar infiltrate or atelectasis and probable small pleural effusion. Signed by Manav Porter MD    CT angio brain attack head w IV contrast and post procedure    Result Date: 1/16/2024  Interpreted By:  Gabriel Henry, STUDY: CT ANGIO BRAIN ATTACK NECK W IV CONTRAST AND POST PROCEDURE; 1/16/2024 6:47 pm   INDICATION: Signs/Symptoms:left sided weakness.   COMPARISON: Noncontrast head CT 5:58 p.m.   ACCESSION NUMBER(S): [id number]   ORDERING CLINICIAN: BEV MATAMOROS   TECHNIQUE: Bolus iodinated contrast was administered intravenously and axial images of the head and neck were acquired.  Coronal, sagittal, and 3-D reconstructions were provided for review.   FINDINGS: Findings CT angiography neck: Anatomic variant joint origin of the left common carotid and brachiocephalic arteries. No other significant abnormality of the origins of the great vessels was identified except for atherosclerotic calcification. Common carotid arteries are patent in the region of the neck. Atherosclerotic calcification of the  proximal cervical internal carotid arteries noted. Anatomic variant partially retropharyngeal course of the right cervical internal carotid artery. Vertebral arteries are patent in the region of the neck the right side being dominant. Limited evaluation through the lung apices is grossly unremarkable.   Findings CT angiography head: Distal internal carotid arteries are patent and branch appropriately into the anterior and middle cerebral arteries without proximal hemodynamically significant stenosis or other abnormality identified. There is abrupt attenuation of a left M2 branch on or about image 403 series 2 extending towards the region of encephalomalacia in the left frontal lobe. Distal vertebral arteries are apparently patent with left vertebral artery probably terminating in the left posteroinferior cerebellar artery. The right vertebral artery gives rise to the basilar artery which in turn gives rise to the right posterior cerebral artery which also receives a prominent contribution from a prominent right posterior communicating artery. Hypoplastic left P1 segment with the left posterior cerebral artery arising principally via a prominent left posterior communicating artery.         No evidence for significant stenosis or large branch vessel cutoffs of the major extracranial or intracranial arterial vasculature attenuation of a left M2 branch extending towards the region of left frontal lobe encephalomalacia. MRI would be more sensitive and specific if recent ischemia were clinically suspected..   MACRO: None   Signed by: Gabriel Henry 1/16/2024 7:01 PM Dictation workstation:   [id number]    CT angio brain attack neck w IV contrast and post procedure    Result Date: 1/16/2024  Interpreted By:  Gabriel Henry, STUDY: CT ANGIO BRAIN ATTACK NECK W IV CONTRAST AND POST PROCEDURE; 1/16/2024 6:47 pm   INDICATION: Signs/Symptoms:left sided weakness.   COMPARISON: Noncontrast head CT 5:58 p.m.   ACCESSION NUMBER(S):  [id number]   ORDERING CLINICIAN: BEV MATAMOROS   TECHNIQUE: Bolus iodinated contrast was administered intravenously and axial images of the head and neck were acquired.  Coronal, sagittal, and 3-D reconstructions were provided for review.   FINDINGS: Findings CT angiography neck: Anatomic variant joint origin of the left common carotid and brachiocephalic arteries. No other significant abnormality of the origins of the great vessels was identified except for atherosclerotic calcification. Common carotid arteries are patent in the region of the neck. Atherosclerotic calcification of the proximal cervical internal carotid arteries noted. Anatomic variant partially retropharyngeal course of the right cervical internal carotid artery. Vertebral arteries are patent in the region of the neck the right side being dominant. Limited evaluation through the lung apices is grossly unremarkable.   Findings CT angiography head: Distal internal carotid arteries are patent and branch appropriately into the anterior and middle cerebral arteries without proximal hemodynamically significant stenosis or other abnormality identified. There is abrupt attenuation of a left M2 branch on or about image 403 series 2 extending towards the region of encephalomalacia in the left frontal lobe. Distal vertebral arteries are apparently patent with left vertebral artery probably terminating in the left posteroinferior cerebellar artery. The right vertebral artery gives rise to the basilar artery which in turn gives rise to the right posterior cerebral artery which also receives a prominent contribution from a prominent right posterior communicating artery. Hypoplastic left P1 segment with the left posterior cerebral artery arising principally via a prominent left posterior communicating artery.         No evidence for significant stenosis or large branch vessel cutoffs of the major extracranial or intracranial arterial vasculature attenuation  of a left M2 branch extending towards the region of left frontal lobe encephalomalacia. MRI would be more sensitive and specific if recent ischemia were clinically suspected..   MACRO: None   Signed by: Gabriel Henry 1/16/2024 7:01 PM Dictation workstation:   [id number]    CT brain attack head wo IV contrast    Result Date: 1/16/2024  Interpreted By:  Antony Dixon, STUDY: CT BRAIN ATTACK HEAD WO IV CONTRAST;  1/16/2024 6:02 pm   INDICATION: Signs/Symptoms:left sided weakness.   COMPARISON: Head CT 02/08/2023   ACCESSION NUMBER(S): [id number]   ORDERING CLINICIAN: BEV MATAMOROS   TECHNIQUE: Noncontrast axial CT scan of head was performed. Angled reformats in brain and bone windows were generated. The images were reviewed in bone, brain, blood and soft tissue windows.   FINDINGS: CSF Spaces: Ventricles appear stable with respect to size and configuration. There is no extraaxial fluid collection.   Parenchyma: Small lacunar type infarct of the left posterior cerebellum. Redemonstration of large distribution encephalomalacia of the left MCA territory with overlying postsurgical changes from prior left-sided craniotomy. Periventricular and subcortical white matter hypoattenuation is nonspecific, however likely reflects chronic microvascular ischemic versus chronic hypertensive changes. The grey-white differentiation is intact. There is no mass effect or midline shift.  There is no intracranial hemorrhage.   Calvarium: The calvarium is unremarkable.   Paranasal sinuses and mastoids: Visualized paranasal sinuses and mastoids are clear.       No evidence of acute cortical infarct or intracranial hemorrhage.   Stable intracranial findings since comparison head CT 02/08/2023.   MACRO: Antony Dixon discussed the significance and urgency of this critical finding by telephone with  BEV MATAMOROS on 1/16/2024 at 6:08 pm. (**-RCF-**) Findings:  See findings.       Signed by: Antony Dixon 1/16/2024 6:09 PM Dictation  workstation:   [id number]            Assessment/Plan   Principal Problem:    TIA (transient ischemic attack)  Active Problems:    History of DVT (deep vein thrombosis)    Aphasia following cerebrovascular accident (CVA)    Essential (primary) hypertension    Hemiparesis (CMS/HCC)    Parkinson's disease    Partial symptomatic epilepsy with complex partial seizures, not intractable, without status epilepticus (CMS/HCC)    COVID-19    Darwin Castro is a 68 y.o. 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 Denies fever, chills, SOB, CP, N/V/D, dysuria. HPI limited due to patient's chronic aphasia from stroke. Pt's brother is no longer at bedside to provide additional information, majority of history obtained via chart review. Brother present in ED and stated he spoke with nursing staff and they noticed the pt was lethargic appearing at around 3pm but are unsure when she was seen prior to that. Family spoke with patient on the phone yesterday and stated she seemed to be at her baseline mental status at the time.        ED course: Lab work showed CBC without leukocytosis, CMP with low potassium at 3.3. Troponin normal. Magnesium normal. PT-INR normal. TSH levels normal. UA showed +protein, +ketones. Positive COVID PCR. CT brain showed no acute findings with stable intracranial findings since comparison head CT 02/08/2023. CTA negative. CXR showed mild left basilar infiltrate or atelectasis and probable small pleural effusion. EKG showed no ischemic ST changes.      AMS/ Left sided weakness   -CBC: no leukocytosis, no acute anemia, no thrombocytopenia  -CMP: no significant electrolyte abnormalities, no acute renal/liver dysfunction appreciated   -CTH: No evidence of acute cortical infarct or intracranial hemorrhage. Stable intracranial findings since  comparison head CT 02/08/202  -CTA: negative  -EKG: non-ischemic  -HS CHELLE: normal  -TSH: normal  -UA neg for UTI  -Magnesium: normal  -placed tia/stroke care path --> neuro checks as appropriate   -monitor on tele   -MRI brain w/o contrast  -ASA/statin  -Hba1c/lipid panel  -ECHO and MRA not indicated - Neuro saw pt at bedside overnight and agreed with plan - no need for full neuro consult: Verified with nightshift LATOYA- AMS 2/2 COVID?  -should MRI show acute neurologic process, will get echo   -optimize glycemic control  -Permissive HTN for 24h (SBP < 220 or DBP < 110)  -PT/OT/SLP as appropriate  -IV Keppra given - lab levels normal  -Continue home meds as appropriate     COVID-19  -Positive COVID PCR  -Supportive care  -no respiratory symptoms  - COVID encephalopathy? Will give 1x thiamine and monitor response     Parkinson's  -Per Dr. Marte note in December continue with 1 tab Sinemet 25/100.  However med list from nursing home received and it does appear she has been ordered 2 tablets since 2022  -Given she has been on 2 tablets at the nursing facility, will continue with this dosage     Seizures  -Continue Keppra  -Seizure precautions      Medlist from facility received and reviewed-> reordered   GI prophylaxis   -Protonix. Bowel regimen.      VTE prophylaxis  -Lovenox  -SCDs     DC plan:  DC home when medically stable    Labs/Testing reviewed    Interdisciplinary team rounding completed with hospitalist, nurse, TCC    NP discussed plan and lab/testing results with Dr. Wilkinson       Spoke with Dr. Cobb who follows this patient, will assume care starting tomorrow       Marlyn Mendoza, APRN-CNP

## 2024-01-17 NOTE — PROGRESS NOTES
Patient is from [facility].  Patient will return to [facility] since she is Covid+.  Patient has traditional medicare and under obs status so she is not eligible for SNF placement unless she is upgraded to inpatient status and has a 3 midnight inpatient stay.  Patient is most likely return to her facility today or tomorrow.    1304 Report Number [phone] ask for purple unit--info shared via secure chat with FIDENCIO Melendez, Donna unit secretary and FIDENCIO Olmos charge nurse.

## 2024-01-18 ENCOUNTER — APPOINTMENT (OUTPATIENT)
Dept: RADIOLOGY | Facility: HOSPITAL | Age: 69
End: 2024-01-18
Payer: MEDICARE

## 2024-01-18 VITALS
WEIGHT: 181.88 LBS | BODY MASS INDEX: 34.34 KG/M2 | OXYGEN SATURATION: 98 % | SYSTOLIC BLOOD PRESSURE: 152 MMHG | RESPIRATION RATE: 17 BRPM | HEIGHT: 61 IN | TEMPERATURE: 98.4 F | DIASTOLIC BLOOD PRESSURE: 87 MMHG | HEART RATE: 62 BPM

## 2024-01-18 LAB
ANION GAP SERPL CALC-SCNC: 12 MMOL/L (ref 10–20)
ATRIAL RATE: 66 BPM
BUN SERPL-MCNC: 8 MG/DL (ref 6–23)
CALCIUM SERPL-MCNC: 8.2 MG/DL (ref 8.6–10.3)
CHLORIDE SERPL-SCNC: 105 MMOL/L (ref 98–107)
CO2 SERPL-SCNC: 28 MMOL/L (ref 21–32)
CREAT SERPL-MCNC: 0.76 MG/DL (ref 0.5–1.05)
EGFRCR SERPLBLD CKD-EPI 2021: 85 ML/MIN/1.73M*2
ERYTHROCYTE [DISTWIDTH] IN BLOOD BY AUTOMATED COUNT: 13.2 % (ref 11.5–14.5)
GLUCOSE BLD MANUAL STRIP-MCNC: 108 MG/DL (ref 74–99)
GLUCOSE BLD MANUAL STRIP-MCNC: 127 MG/DL (ref 74–99)
GLUCOSE BLD MANUAL STRIP-MCNC: 194 MG/DL (ref 74–99)
GLUCOSE SERPL-MCNC: 137 MG/DL (ref 74–99)
HCT VFR BLD AUTO: 39.5 % (ref 36–46)
HGB BLD-MCNC: 12.4 G/DL (ref 12–16)
MCH RBC QN AUTO: 30 PG (ref 26–34)
MCHC RBC AUTO-ENTMCNC: 31.4 G/DL (ref 32–36)
MCV RBC AUTO: 96 FL (ref 80–100)
NRBC BLD-RTO: 0 /100 WBCS (ref 0–0)
P AXIS: 86 DEGREES
P OFFSET: 206 MS
P ONSET: 146 MS
PLATELET # BLD AUTO: 131 X10*3/UL (ref 150–450)
POTASSIUM SERPL-SCNC: 3.5 MMOL/L (ref 3.5–5.3)
PR INTERVAL: 154 MS
Q ONSET: 223 MS
QRS COUNT: 11 BEATS
QRS DURATION: 78 MS
QT INTERVAL: 420 MS
QTC CALCULATION(BAZETT): 440 MS
QTC FREDERICIA: 434 MS
R AXIS: 54 DEGREES
RBC # BLD AUTO: 4.13 X10*6/UL (ref 4–5.2)
SODIUM SERPL-SCNC: 141 MMOL/L (ref 136–145)
T AXIS: 27 DEGREES
T OFFSET: 433 MS
VENTRICULAR RATE: 66 BPM
WBC # BLD AUTO: 4.5 X10*3/UL (ref 4.4–11.3)

## 2024-01-18 PROCEDURE — 2500000001 HC RX 250 WO HCPCS SELF ADMINISTERED DRUGS (ALT 637 FOR MEDICARE OP): Performed by: PHYSICIAN ASSISTANT

## 2024-01-18 PROCEDURE — 2500000004 HC RX 250 GENERAL PHARMACY W/ HCPCS (ALT 636 FOR OP/ED): Mod: MUE | Performed by: PHYSICIAN ASSISTANT

## 2024-01-18 PROCEDURE — 82947 ASSAY GLUCOSE BLOOD QUANT: CPT

## 2024-01-18 PROCEDURE — G0378 HOSPITAL OBSERVATION PER HR: HCPCS

## 2024-01-18 PROCEDURE — 80048 BASIC METABOLIC PNL TOTAL CA: CPT | Performed by: NURSE PRACTITIONER

## 2024-01-18 PROCEDURE — 85027 COMPLETE CBC AUTOMATED: CPT | Performed by: NURSE PRACTITIONER

## 2024-01-18 PROCEDURE — 2500000001 HC RX 250 WO HCPCS SELF ADMINISTERED DRUGS (ALT 637 FOR MEDICARE OP): Performed by: NURSE PRACTITIONER

## 2024-01-18 PROCEDURE — 70551 MRI BRAIN STEM W/O DYE: CPT | Performed by: RADIOLOGY

## 2024-01-18 PROCEDURE — 70551 MRI BRAIN STEM W/O DYE: CPT

## 2024-01-18 PROCEDURE — 36415 COLL VENOUS BLD VENIPUNCTURE: CPT | Performed by: NURSE PRACTITIONER

## 2024-01-18 RX ORDER — BENZONATATE 100 MG/1
100 CAPSULE ORAL 3 TIMES DAILY PRN
Qty: 20 CAPSULE | Refills: 0 | Status: SHIPPED | OUTPATIENT
Start: 2024-01-18

## 2024-01-18 RX ORDER — ATORVASTATIN CALCIUM 40 MG/1
40 TABLET, FILM COATED ORAL NIGHTLY
Start: 2024-01-18

## 2024-01-18 RX ADMIN — DIVALPROEX SODIUM 125 MG: 125 CAPSULE, COATED PELLETS ORAL at 08:21

## 2024-01-18 RX ADMIN — LEVETIRACETAM 500 MG: 500 TABLET, FILM COATED ORAL at 08:21

## 2024-01-18 RX ADMIN — DOCUSATE SODIUM 100 MG: 100 CAPSULE, LIQUID FILLED ORAL at 08:21

## 2024-01-18 RX ADMIN — GABAPENTIN 800 MG: 400 CAPSULE ORAL at 16:31

## 2024-01-18 RX ADMIN — CITALOPRAM HYDROBROMIDE 30 MG: 10 TABLET ORAL at 08:20

## 2024-01-18 RX ADMIN — PANTOPRAZOLE SODIUM 40 MG: 40 TABLET, DELAYED RELEASE ORAL at 07:00

## 2024-01-18 RX ADMIN — CLONAZEPAM 0.5 MG: 0.5 TABLET ORAL at 16:30

## 2024-01-18 RX ADMIN — ASPIRIN 81 MG: 81 TABLET, COATED ORAL at 08:21

## 2024-01-18 RX ADMIN — CLONAZEPAM 0.5 MG: 0.5 TABLET ORAL at 08:21

## 2024-01-18 RX ADMIN — GABAPENTIN 800 MG: 400 CAPSULE ORAL at 08:20

## 2024-01-18 RX ADMIN — CARBIDOPA AND LEVODOPA 2 TABLET: 25; 100 TABLET ORAL at 08:21

## 2024-01-18 RX ADMIN — METOPROLOL TARTRATE 25 MG: 25 TABLET, FILM COATED ORAL at 08:21

## 2024-01-18 RX ADMIN — CARBIDOPA AND LEVODOPA 2 TABLET: 25; 100 TABLET ORAL at 16:30

## 2024-01-18 RX ADMIN — BUSPIRONE HYDROCHLORIDE 10 MG: 10 TABLET ORAL at 08:21

## 2024-01-18 ASSESSMENT — COGNITIVE AND FUNCTIONAL STATUS - GENERAL
MOVING FROM LYING ON BACK TO SITTING ON SIDE OF FLAT BED WITH BEDRAILS: A LOT
TOILETING: A LOT
DRESSING REGULAR LOWER BODY CLOTHING: A LOT
TURNING FROM BACK TO SIDE WHILE IN FLAT BAD: A LOT
PERSONAL GROOMING: A LOT
STANDING UP FROM CHAIR USING ARMS: A LOT
STANDING UP FROM CHAIR USING ARMS: A LOT
MOVING FROM LYING ON BACK TO SITTING ON SIDE OF FLAT BED WITH BEDRAILS: A LOT
CLIMB 3 TO 5 STEPS WITH RAILING: A LOT
TOILETING: A LOT
PERSONAL GROOMING: A LOT
DAILY ACTIVITIY SCORE: 14
TURNING FROM BACK TO SIDE WHILE IN FLAT BAD: A LOT
MOBILITY SCORE: 12
DRESSING REGULAR UPPER BODY CLOTHING: A LOT
MOVING TO AND FROM BED TO CHAIR: A LOT
WALKING IN HOSPITAL ROOM: A LOT
CLIMB 3 TO 5 STEPS WITH RAILING: A LOT
MOBILITY SCORE: 12
WALKING IN HOSPITAL ROOM: A LOT
HELP NEEDED FOR BATHING: A LOT
HELP NEEDED FOR BATHING: A LOT
DRESSING REGULAR UPPER BODY CLOTHING: A LOT
DAILY ACTIVITIY SCORE: 14
DRESSING REGULAR LOWER BODY CLOTHING: A LOT
MOVING TO AND FROM BED TO CHAIR: A LOT

## 2024-01-18 ASSESSMENT — PAIN - FUNCTIONAL ASSESSMENT
PAIN_FUNCTIONAL_ASSESSMENT: 0-10
PAIN_FUNCTIONAL_ASSESSMENT: 0-10

## 2024-01-18 ASSESSMENT — PAIN SCALES - GENERAL
PAINLEVEL_OUTOF10: 0 - NO PAIN

## 2024-01-18 NOTE — NURSING NOTE
0800 assumed care. Alert to self. Can be forgetful. Due meds tolerated well. No complaints of pain. MRI completed. Discharge orders received. Daughter notified.  1820 Report called to [facility] unit. Left unit via stretcher with all belongings.

## 2024-01-18 NOTE — PROGRESS NOTES
Darwin Castro is a 68 y.o. female on day 0 of admission presenting with TIA (transient ischemic attack).    Plan: Awaiting MRI that is scheduled for this morning. If negative, patient can be discharged to [facility]. Updated facility and care team. Asked for transportation to be arranged for 3pm, and will cancel if needed. Care team updated on plan.   Disposition: [facility]/ [phone] and ask for the purple unit.   Barrier: MRI results  ADOD:  today    Cindy Edgar RN

## 2024-01-18 NOTE — CONSULTS
Nutrition Assessment Note  Nutrition Assessment      Reason for Assessment  Reason for Assessment: Admission nursing screening Swallowing eval needed. 1/16/24- swallow eval complete, pt found with no difficulties.    Pt in isolation: JOSE ENRIQUE Granados is a 68 y.o. female that was admitted for TIA  Pt came in with AMS, left sided weakness and aphasia.  PMH includes: Parkinsons, CVA with residual right hemiparesis/aphasia, epilepsy.     Spoke with brother- stated that pt has good appetite, doesn't always like the facility food (ours nor the nursing home). Brother states that pt eats many sweets, fast foods, junk foods and foods that he brings her. Brother denies any difficulties swallowing/chewing. Brother states that she may have lost some wt over the past 6 months, if he were to guess 10# maybe.  Brother denies pt use of any nutritional supplements.    No nutrition intervention is indicated at this time. Please reconsult if there are changes in patient status & nutrition therapy is warranted.      Dietary Orders (From admission, onward)       Start     Ordered    01/16/24 2238  Adult diet Cardiac; 70 gm fat; 2 - 3 grams Sodium  Diet effective now        Question Answer Comment   Diet type Cardiac    Fat restriction: 70 gm fat    Sodium restriction: 2 - 3 grams Sodium        01/16/24 2244                  History:    History reviewed. No pertinent past medical history.      Scheduled medications  aspirin, 81 mg, oral, Daily  atorvastatin, 40 mg, oral, Nightly  busPIRone, 10 mg, oral, BID  carbidopa-levodopa, 2 tablet, oral, TID  citalopram, 30 mg, oral, Daily  clonazePAM, 0.5 mg, oral, TID  divalproex sprinkle, 125 mg, oral, BID  docusate sodium, 100 mg, oral, BID  enoxaparin, 40 mg, subcutaneous, q24h  gabapentin, 800 mg, oral, TID  insulin lispro, 0-5 Units, subcutaneous, Before meals & nightly  levETIRAcetam, 500 mg, oral, BID  metoprolol tartrate, 25 mg, oral, BID  pantoprazole, 40 mg, oral, Daily before  "breakfast  prazosin, 1 mg, oral, Nightly  traZODone, 200 mg, oral, Nightly      Continuous medications     PRN medications  PRN medications: acetaminophen, benzonatate, dextrose 10 % in water (D10W), dextrose, glucagon     Latest Reference Range & Units 01/17/24 04:54 01/18/24 04:56   GLUCOSE 74 - 99 mg/dL 75 137 (H)   SODIUM 136 - 145 mmol/L 142 141   POTASSIUM 3.5 - 5.3 mmol/L 3.4 (L) 3.5   CHLORIDE 98 - 107 mmol/L 105 105   Bicarbonate 21 - 32 mmol/L 28 28   Anion Gap 10 - 20 mmol/L 12 12   Blood Urea Nitrogen 6 - 23 mg/dL 6 8   Creatinine 0.50 - 1.05 mg/dL 0.73 0.76   EGFR >60 mL/min/1.73m*2 90 85   Calcium 8.6 - 10.3 mg/dL 8.8 8.2 (L)   MAGNESIUM 1.60 - 2.40 mg/dL 1.70    (H): Data is abnormally high  (L): Data is abnormally low  Rpt: View report in Results Review for more information  Food and Nutrient History  Energy Intake: Good > 75 %  Food and Nutrient History: Pt family states that pt doesn't always like facility foods- eats a lot of junk foods, fast foods. Brother states will bring foods in for her sometimes. RN states that brother brought [employer] in today and pt ate all of it.     Anthropometrics:  Height: 154.9 cm (5' 0.98\")  Weight: 82.5 kg (181 lb 14.1 oz)  BMI (Calculated): 34.38    Weight Change: 0    Wt Readings from Last 3 Encounters:   01/18/24 82.5 kg (181 lb 14.1 oz)   11/29/23 80.7 kg (178 lb)   12/08/22 80.7 kg (178 lb)     Weight Change  Significant Weight Loss: No     IBW/kg (Dietitian Calculated): 47.7 kg  Percent of IBW: 173 %        Nutrition Focused Physical Findings:  Subcutaneous Fat Loss  Orbital Fat Pads: Defer (Pt in isolation- COVID 19)     Edema  Edema: none     Physical Findings (Nutrition Deficiency/Toxicity)  Skin: Negative     Nutrition Diagnosis   Malnutrition Diagnosis  Patient has Malnutrition Diagnosis: No    Patient has Nutrition Diagnosis: No                 Nutrition Interventions/Recommendations      No nutrition intervention is indicated at this time. Please " reconsult if there are changes in patient status & nutrition therapy is warranted.       Nutrition Monitoring and Evaluation     No nutrition intervention is indicated at this time.       Follow Up  Time Spent (min): 30 minutes  Last Date of Nutrition Visit: 01/18/24  Nutrition Follow-Up Needed?: Dietitian to reassess per policy  Follow up Comment: Vitaly RAMESH

## 2024-01-18 NOTE — DISCHARGE SUMMARY
Discharge Diagnosis  TIA (transient ischemic attack)  COVID,  History of CVA in the past.      Issues Requiring Follow-Up  Follow-up with the neurologist.      Test Results Pending At Discharge  Pending Labs       Order Current Status    Ammonia Collected (01/16/24 1914)            Hospital Course  68-year-old -American female, she lives in an assisted living, she is known to history of CVA, with weakness, usually wheelchair-bound, and also expressive aphasia, and also history of migraine, anxiety, depression, bipolar, hypertension, hyperlipidemia, she was sent to the emergency department with increasing weakness, she was admitted as transient ischemic attack and rule out stroke, she was seen by the neurologist, she had an MRI done which did not show any new changes,.  She was also diagnosed with COVID, she was not hypoxic that she was observed.  She was discharged back to skilled nursing facility     Pertinent Physical Exam At Time of Discharge  Physical Exam  Alert oriented to 3.  HEENT unremarkable.  Neck no JVD.  Heart S1-S2,  Lungs clear.  Abdomen is soft nontender.    Weakness.  Wheelchair-bound.    Home Medications     Medication List      START taking these medications     atorvastatin 40 mg tablet; Commonly known as: Lipitor; Take 1 tablet (40   mg) by mouth once daily at bedtime.   benzonatate 100 mg capsule; Commonly known as: Tessalon; Take 1 capsule   (100 mg) by mouth 3 times a day as needed for cough. Do not crush or chew.     CONTINUE taking these medications     acetaminophen 325 mg tablet; Commonly known as: Tylenol   Acidophilus capsule; Generic drug: lactobacillus acidophilus   Ambien 5 mg tablet; Generic drug: zolpidem   aspirin 81 mg EC tablet   busPIRone 10 mg tablet; Commonly known as: Buspar   carbidopa-levodopa  mg tablet; Commonly known as: Sinemet   cholecalciferol 25 MCG (1000 UT) capsule; Commonly known as: Vitamin D-3   citalopram 30 mg capsule   clonazePAM 0.5 mg tablet;  Commonly known as: KlonoPIN   divalproex sprinkle 125 mg DR capsule; Commonly known as: Depakote   Sprinkle   docusate sodium 100 mg capsule; Commonly known as: Colace   gabapentin 800 mg tablet; Commonly known as: Neurontin   levETIRAcetam 500 mg tablet; Commonly known as: Keppra   metoprolol tartrate 25 mg tablet; Commonly known as: Lopressor   prazosin 1 mg capsule; Commonly known as: Minipress   traZODone 100 mg tablet; Commonly known as: Desyrel   valACYclovir 500 mg tablet; Commonly known as: Valtrex       Outpatient Follow-Up  Future Appointments   Date Time Provider Department [place]   2/12/2024 10:00 AM Abelardo Amezquita MD [id number] [facility]   6/6/2024  1:00 PM Crispin Marte MD [id number] [facility]     Discharge timing more than 35 minutes  Cookie Cobb MD

## 2024-01-20 LAB — GLUCOSE BLD MANUAL STRIP-MCNC: 135 MG/DL (ref 74–99)

## 2024-02-08 PROBLEM — H90.3 SENSORINEURAL HEARING LOSS (SNHL) OF BOTH EARS: Status: ACTIVE | Noted: 2023-01-18

## 2024-02-08 PROBLEM — G24.01 TARDIVE DYSKINESIA: Status: ACTIVE | Noted: 2024-02-08

## 2024-02-08 PROBLEM — H61.20 IMPACTED CERUMEN: Status: ACTIVE | Noted: 2024-02-08

## 2024-02-08 PROBLEM — H91.90 HEARING LOSS: Status: ACTIVE | Noted: 2024-02-08

## 2024-02-08 PROBLEM — R26.9 ABNORMAL GAIT: Status: ACTIVE | Noted: 2024-02-08

## 2024-02-08 PROBLEM — M25.511 SHOULDER PAIN, BILATERAL: Status: ACTIVE | Noted: 2024-02-08

## 2024-02-08 PROBLEM — R51.9 HEADACHE: Status: ACTIVE | Noted: 2024-02-08

## 2024-02-08 PROBLEM — H69.93 DISORDER OF BOTH EUSTACHIAN TUBES: Status: ACTIVE | Noted: 2023-11-14

## 2024-02-08 PROBLEM — A60.00 HERPES, GENITAL: Status: ACTIVE | Noted: 2024-02-08

## 2024-02-08 PROBLEM — H92.02 LEFT EAR PAIN: Status: ACTIVE | Noted: 2024-02-08

## 2024-02-08 PROBLEM — R91.1 LUNG NODULE, SOLITARY: Status: ACTIVE | Noted: 2024-02-08

## 2024-02-08 PROBLEM — M26.609 TEMPOROMANDIBULAR JOINT DISORDER: Status: ACTIVE | Noted: 2024-02-08

## 2024-02-08 PROBLEM — F19.11 HISTORY OF DRUG ABUSE (MULTI): Status: ACTIVE | Noted: 2024-02-08

## 2024-02-08 PROBLEM — R26.9 GAIT DISORDER: Status: ACTIVE | Noted: 2024-02-08

## 2024-02-08 PROBLEM — H92.03 OTALGIA OF BOTH EARS: Status: ACTIVE | Noted: 2024-02-08

## 2024-02-08 PROBLEM — Z86.79 HISTORY OF HYPERTENSION: Status: ACTIVE | Noted: 2024-02-08

## 2024-02-08 PROBLEM — M25.512 SHOULDER PAIN, BILATERAL: Status: ACTIVE | Noted: 2024-02-08

## 2024-02-12 ENCOUNTER — APPOINTMENT (OUTPATIENT)
Dept: OTOLARYNGOLOGY | Facility: CLINIC | Age: 69
End: 2024-02-12
Payer: MEDICARE

## 2024-02-26 ENCOUNTER — LAB REQUISITION (OUTPATIENT)
Dept: LAB | Facility: HOSPITAL | Age: 69
End: 2024-02-26
Payer: MEDICARE

## 2024-02-26 DIAGNOSIS — R35.0 FREQUENCY OF MICTURITION: ICD-10-CM

## 2024-02-26 LAB
APPEARANCE UR: CLEAR
BACTERIA #/AREA URNS AUTO: ABNORMAL /HPF
BILIRUB UR STRIP.AUTO-MCNC: NEGATIVE MG/DL
COLOR UR: YELLOW
GLUCOSE UR STRIP.AUTO-MCNC: NEGATIVE MG/DL
HYALINE CASTS #/AREA URNS AUTO: ABNORMAL /LPF
KETONES UR STRIP.AUTO-MCNC: ABNORMAL MG/DL
LEUKOCYTE ESTERASE UR QL STRIP.AUTO: ABNORMAL
MUCOUS THREADS #/AREA URNS AUTO: ABNORMAL /LPF
NITRITE UR QL STRIP.AUTO: NEGATIVE
PH UR STRIP.AUTO: 5 [PH]
PROT UR STRIP.AUTO-MCNC: NEGATIVE MG/DL
RBC # UR STRIP.AUTO: NEGATIVE /UL
RBC #/AREA URNS AUTO: ABNORMAL /HPF
SP GR UR STRIP.AUTO: 1.02
SQUAMOUS #/AREA URNS AUTO: ABNORMAL /HPF
UROBILINOGEN UR STRIP.AUTO-MCNC: <2 MG/DL
WBC #/AREA URNS AUTO: ABNORMAL /HPF

## 2024-02-26 PROCEDURE — 81001 URINALYSIS AUTO W/SCOPE: CPT

## 2024-06-06 ENCOUNTER — APPOINTMENT (OUTPATIENT)
Dept: NEUROLOGY | Facility: CLINIC | Age: 69
End: 2024-06-06
Payer: MEDICARE

## 2024-07-24 ENCOUNTER — APPOINTMENT (OUTPATIENT)
Dept: NEUROLOGY | Facility: CLINIC | Age: 69
End: 2024-07-24
Payer: MEDICARE

## 2024-07-24 VITALS — TEMPERATURE: 96.9 F | HEART RATE: 61 BPM | SYSTOLIC BLOOD PRESSURE: 146 MMHG | DIASTOLIC BLOOD PRESSURE: 74 MMHG

## 2024-07-24 DIAGNOSIS — G20.A1 PARKINSON'S DISEASE WITHOUT DYSKINESIA OR FLUCTUATING MANIFESTATIONS (MULTI): ICD-10-CM

## 2024-07-24 DIAGNOSIS — G40.209 PARTIAL SYMPTOMATIC EPILEPSY WITH COMPLEX PARTIAL SEIZURES, NOT INTRACTABLE, WITHOUT STATUS EPILEPTICUS (MULTI): Primary | ICD-10-CM

## 2024-07-24 DIAGNOSIS — R51.9 NONINTRACTABLE HEADACHE, UNSPECIFIED CHRONICITY PATTERN, UNSPECIFIED HEADACHE TYPE: ICD-10-CM

## 2024-07-24 DIAGNOSIS — I69.320 APHASIA AS LATE EFFECT OF CEREBROVASCULAR ACCIDENT: ICD-10-CM

## 2024-07-24 DIAGNOSIS — I69.351 HEMIPARESIS AFFECTING RIGHT SIDE AS LATE EFFECT OF CEREBROVASCULAR ACCIDENT (MULTI): ICD-10-CM

## 2024-07-24 PROCEDURE — 1159F MED LIST DOCD IN RCRD: CPT | Performed by: PSYCHIATRY & NEUROLOGY

## 2024-07-24 PROCEDURE — 3078F DIAST BP <80 MM HG: CPT | Performed by: PSYCHIATRY & NEUROLOGY

## 2024-07-24 PROCEDURE — 99213 OFFICE O/P EST LOW 20 MIN: CPT | Performed by: PSYCHIATRY & NEUROLOGY

## 2024-07-24 PROCEDURE — 1036F TOBACCO NON-USER: CPT | Performed by: PSYCHIATRY & NEUROLOGY

## 2024-07-24 PROCEDURE — 3077F SYST BP >= 140 MM HG: CPT | Performed by: PSYCHIATRY & NEUROLOGY

## 2024-07-24 RX ORDER — LOPERAMIDE HCL 2 MG
2 TABLET ORAL 4 TIMES DAILY PRN
COMMUNITY

## 2024-07-24 NOTE — PROGRESS NOTES
"NEUROLOGY OUTPATIENT FOLLOW-UP NOTE    Assessment/Plan   Diagnoses and all orders for this visit:  Partial symptomatic epilepsy with complex partial seizures, not intractable, without status epilepticus (Multi)  Nonintractable headache, unspecified chronicity pattern, unspecified headache type  Parkinson's disease without dyskinesia or fluctuating manifestations (Multi)  Hemiparesis affecting right side as late effect of cerebrovascular accident (Multi)  Aphasia as late effect of cerebrovascular accident      IMPRESSION: Chronic daily headaches in the context of prior craniotomy, with reduced severity on gabapentin, Stable Parkinson's disease. Residual right hemiparesis and aphasia referable to prior stroke. Controlled partial seizures on levetiracetam.     PLAN: Continue gabapentin 800 mg tid for headache prophylaxis, though if severity increases I will increase the dose further. To continue the current dose of carbidopa/levodopa  tid with meals for the PD. To continue levetiracetam 500 mg bid for seizure prophylaxis. I will see her in six months or prn.      Crispin Marte Jr., M.D., [facility]   ----------    Subjective     Danikaangélica Castro is a 69 y.o. year old female here for follow-up.  Her brother accompanies her and provides other information as the patient can only provide limited information due to aphasia.    The patient complains of headaches today.  However, her brother, who sees her regularly, reports that she has not made any complaints about headache in the last few months, indicating the the gabapentin is helping.  No seizures reported on levetiracetam 500 mg bid.  She was last at [facility] in January for \"TIA\", but the presentation was generalized weakness, and the only abnormality found was that she was COVID-19 positive.  Cranial MRI during the hospitalization revealed no acute pathology.  PD is stable on carbidopa/levodopa 25/100 2 tabs tid.    No past medical history on file.  Past Surgical " History:   Procedure Laterality Date    OTHER SURGICAL HISTORY  10/15/2019    Brain surgery     Social History     Tobacco Use    Smoking status: Never    Smokeless tobacco: Never   Substance Use Topics    Alcohol use: Never     family history is not on file.    Current Outpatient Medications:     acetaminophen (Tylenol) 325 mg tablet, Take 2 tablets (650 mg) by mouth every 4 hours if needed for mild pain (1 - 3)., Disp: , Rfl:     aspirin 81 mg EC tablet, Take 1 tablet (81 mg) by mouth once daily., Disp: , Rfl:     atorvastatin (Lipitor) 40 mg tablet, Take 1 tablet (40 mg) by mouth once daily at bedtime., Disp: , Rfl:     benzonatate (Tessalon) 100 mg capsule, Take 1 capsule (100 mg) by mouth 3 times a day as needed for cough. Do not crush or chew., Disp: 20 capsule, Rfl: 0    busPIRone (Buspar) 10 mg tablet, Take 1 tablet (10 mg) by mouth twice a day., Disp: , Rfl:     carbidopa-levodopa (Sinemet)  mg tablet, Take 2 tablets by mouth 3 times a day., Disp: , Rfl:     cholecalciferol (Vitamin D-3) 25 MCG (1000 UT) capsule, Take 2 capsules (50 mcg) by mouth once daily., Disp: , Rfl:     citalopram 30 mg capsule, Take 30 mg by mouth once daily., Disp: , Rfl:     clonazePAM (KlonoPIN) 0.5 mg tablet, Take 1 tablet (0.5 mg) by mouth 3 times a day., Disp: , Rfl:     divalproex sprinkle (Depakote Sprinkle) 125 mg DR capsule, Take 1 capsule (125 mg) by mouth 2 times a day., Disp: , Rfl:     docusate sodium (Colace) 100 mg capsule, Take 1 capsule (100 mg) by mouth 2 times a day., Disp: , Rfl:     gabapentin (Neurontin) 800 mg tablet, Take 1 tablet (800 mg) by mouth 3 times a day., Disp: , Rfl:     lactobacillus acidophilus (Acidophilus) capsule, Take 1 capsule by mouth once daily., Disp: , Rfl:     levETIRAcetam (Keppra) 500 mg tablet, Take 1 tablet (500 mg) by mouth 2 times a day., Disp: , Rfl:     loperamide (Imodium A-D) 2 mg tablet, Take 1 tablet (2 mg) by mouth 4 times a day as needed for diarrhea., Disp: , Rfl:      metoprolol tartrate (Lopressor) 25 mg tablet, Take 1 tablet (25 mg) by mouth 2 times a day., Disp: , Rfl:     prazosin (Minipress) 1 mg capsule, Take 1 capsule (1 mg) by mouth once daily at bedtime., Disp: , Rfl:     traZODone (Desyrel) 100 mg tablet, Take 2 tablets (200 mg) by mouth once daily at bedtime., Disp: , Rfl:     valACYclovir (Valtrex) 500 mg tablet, Take 1 tablet (500 mg) by mouth once daily., Disp: , Rfl:     zolpidem (Ambien) 5 mg tablet, Take 0.5 tablets (2.5 mg) by mouth as needed at bedtime for sleep., Disp: , Rfl:   Allergies   Allergen Reactions    Penicillins Unknown       Objective     /74   Pulse 61   Temp 36.1 °C (96.9 °F)     CONSTITUTIONAL:  No acute distress     MENTAL STATUS:  Awake, alert, oriented to self, but not to place or time, with poor short-term memory, fair awareness of recent events, normal attention span, concentration, and fund of knowledge.     SPEECH AND LANGUAGE:  Dysnomia, dysfluent speech, but can follow most commands, has difficulty with complex commands, has no dysarthria     CRANIAL NERVES:  II-Vision present, visual fields full to confrontational testing     III/IV/VI--EOMs are present in all directions.  Pupils are symmetrically reactive in dim light.  No ptosis.     V--Normal facial sensation.     VII--No facial asymmetry.     VIII--Hearing present to voice bilaterally.     IX/X--Symmetric soft palate rise.     XI--Normal trapezius power on the left, 4/5 right.     XII--Tongue protrudes without deviation.     MOTOR:  Diffuse right arm weakness: 4/5 deltoid and biceps, 3/5 triceps, 2/5 finger ext, 3/5 finger flex/, diffuse 4/5 right leg power. Hypertonia of the right limbs.      SENSORY:  Present pin sensation in both arms and both legs.     COORDINATION:  Normal finger-to-nose and heel-to-shin testing in both arms and both legs.     REFLEXES are brisk at the right biceps, triceps, brachioradialis, patella, normal on the left, depressed at both ankles.   The plantar responses are flexor.     GAIT stable with minimal assistance of one.        Crispin Marte Jr., M.D., [facility]

## 2025-01-30 ENCOUNTER — APPOINTMENT (OUTPATIENT)
Dept: NEUROLOGY | Facility: CLINIC | Age: 70
End: 2025-01-30
Payer: MEDICARE

## 2025-01-30 VITALS — TEMPERATURE: 96.4 F | SYSTOLIC BLOOD PRESSURE: 104 MMHG | HEART RATE: 62 BPM | DIASTOLIC BLOOD PRESSURE: 54 MMHG

## 2025-01-30 DIAGNOSIS — I69.351 HEMIPARESIS AFFECTING RIGHT SIDE AS LATE EFFECT OF CEREBROVASCULAR ACCIDENT (MULTI): ICD-10-CM

## 2025-01-30 DIAGNOSIS — G40.209 PARTIAL SYMPTOMATIC EPILEPSY WITH COMPLEX PARTIAL SEIZURES, NOT INTRACTABLE, WITHOUT STATUS EPILEPTICUS (MULTI): Primary | ICD-10-CM

## 2025-01-30 DIAGNOSIS — I69.320 APHASIA AS LATE EFFECT OF CEREBROVASCULAR ACCIDENT: ICD-10-CM

## 2025-01-30 DIAGNOSIS — G20.A1 PARKINSON'S DISEASE WITHOUT DYSKINESIA OR FLUCTUATING MANIFESTATIONS: ICD-10-CM

## 2025-01-30 DIAGNOSIS — R51.9 NONINTRACTABLE HEADACHE, UNSPECIFIED CHRONICITY PATTERN, UNSPECIFIED HEADACHE TYPE: ICD-10-CM

## 2025-01-30 PROCEDURE — 99213 OFFICE O/P EST LOW 20 MIN: CPT | Performed by: PSYCHIATRY & NEUROLOGY

## 2025-01-30 PROCEDURE — 1036F TOBACCO NON-USER: CPT | Performed by: PSYCHIATRY & NEUROLOGY

## 2025-01-30 PROCEDURE — 3078F DIAST BP <80 MM HG: CPT | Performed by: PSYCHIATRY & NEUROLOGY

## 2025-01-30 PROCEDURE — 3074F SYST BP LT 130 MM HG: CPT | Performed by: PSYCHIATRY & NEUROLOGY

## 2025-01-30 PROCEDURE — 1159F MED LIST DOCD IN RCRD: CPT | Performed by: PSYCHIATRY & NEUROLOGY

## 2025-01-30 PROCEDURE — 1160F RVW MEDS BY RX/DR IN RCRD: CPT | Performed by: PSYCHIATRY & NEUROLOGY

## 2025-01-30 RX ORDER — DICLOFENAC SODIUM 1 MG/ML
1 SOLUTION/ DROPS OPHTHALMIC 4 TIMES DAILY
COMMUNITY

## 2025-01-30 RX ORDER — MELATONIN 3 MG
CAPSULE ORAL
COMMUNITY

## 2025-01-30 NOTE — PROGRESS NOTES
NEUROLOGY OUTPATIENT FOLLOW-UP NOTE    Assessment/Plan   Diagnoses and all orders for this visit:  Partial symptomatic epilepsy with complex partial seizures, not intractable, without status epilepticus (Multi)  Hemiparesis affecting right side as late effect of cerebrovascular accident (Multi)  Aphasia as late effect of cerebrovascular accident  Parkinson's disease without dyskinesia or fluctuating manifestations  Nonintractable headache, unspecified chronicity pattern, unspecified headache type      IMPRESSION: Chronic daily headaches in the context of prior craniotomy, with reduced severity on gabapentin, Stable Parkinson's disease. Residual right hemiparesis and aphasia referable to prior stroke. Controlled partial seizures on levetiracetam.     PLAN: I discontinued gabapentin 800 mg tid and did a direct sawap to pregabalin 150 mg tid to improve headache prophylaxis, To continue the current dose of carbidopa/levodopa  tid with meals for the PD. To continue levetiracetam 500 mg bid for seizure prophylaxis. I will see her in 3-4 months or prn for effect of the change on the headache.      Crispin Marte Jr., M.D., [facility]   ----------    Subjective     Darwin LYNDSAY Castro is a 69 y.o. year old female here for follow-up.  Her brother accompanies her    She fell today while putting on her coat.  She has right shoulder pain.  No new stroke symptoms  No seizures since the last visit.  Still has daily headaches on gabapentin 800 mg tid.    No past medical history on file.  Past Surgical History:   Procedure Laterality Date    OTHER SURGICAL HISTORY  10/15/2019    Brain surgery     Social History     Tobacco Use    Smoking status: Never    Smokeless tobacco: Never   Substance Use Topics    Alcohol use: Never     family history is not on file.    Current Outpatient Medications:     acetaminophen (Tylenol) 325 mg tablet, Take 2 tablets (650 mg) by mouth every 4 hours if needed for mild pain (1 - 3)., Disp: , Rfl:      aspirin 81 mg EC tablet, Take 1 tablet (81 mg) by mouth once daily., Disp: , Rfl:     atorvastatin (Lipitor) 40 mg tablet, Take 1 tablet (40 mg) by mouth once daily at bedtime., Disp: , Rfl:     benzonatate (Tessalon) 100 mg capsule, Take 1 capsule (100 mg) by mouth 3 times a day as needed for cough. Do not crush or chew. (Patient not taking: Reported on 1/30/2025), Disp: 20 capsule, Rfl: 0    busPIRone (Buspar) 10 mg tablet, Take 1 tablet (10 mg) by mouth twice a day., Disp: , Rfl:     carbidopa-levodopa (Sinemet)  mg tablet, Take 2 tablets by mouth 3 times a day., Disp: , Rfl:     cholecalciferol (Vitamin D-3) 25 MCG (1000 UT) capsule, Take 2 capsules (50 mcg) by mouth once daily., Disp: , Rfl:     citalopram 30 mg capsule, Take 30 mg by mouth once daily., Disp: , Rfl:     clonazePAM (KlonoPIN) 0.5 mg tablet, Take 1 tablet (0.5 mg) by mouth 3 times a day., Disp: , Rfl:     diclofenac (Voltaren) 0.1 % ophthalmic solution, 1 drop 4 times a day., Disp: , Rfl:     divalproex sprinkle (Depakote Sprinkle) 125 mg DR capsule, Take 1 capsule (125 mg) by mouth 2 times a day., Disp: , Rfl:     docusate sodium (Colace) 100 mg capsule, Take 1 capsule (100 mg) by mouth 2 times a day., Disp: , Rfl:     gabapentin (Neurontin) 800 mg tablet, Take 1 tablet (800 mg) by mouth 3 times a day., Disp: , Rfl:     lactobacillus acidophilus (Acidophilus) capsule, Take 1 capsule by mouth once daily., Disp: , Rfl:     levETIRAcetam (Keppra) 500 mg tablet, Take 1 tablet (500 mg) by mouth 2 times a day., Disp: , Rfl:     loperamide (Imodium A-D) 2 mg tablet, Take 1 tablet (2 mg) by mouth 4 times a day as needed for diarrhea., Disp: , Rfl:     melatonin 3 mg capsule, Take by mouth., Disp: , Rfl:     metoprolol tartrate (Lopressor) 25 mg tablet, Take 1 tablet (25 mg) by mouth 2 times a day., Disp: , Rfl:     prazosin (Minipress) 1 mg capsule, Take 1 capsule (1 mg) by mouth once daily at bedtime., Disp: , Rfl:     traZODone (Desyrel) 100 mg  tablet, Take 2 tablets (200 mg) by mouth once daily at bedtime., Disp: , Rfl:     valACYclovir (Valtrex) 500 mg tablet, Take 1 tablet (500 mg) by mouth once daily., Disp: , Rfl:     zolpidem (Ambien) 5 mg tablet, Take 0.5 tablets (2.5 mg) by mouth as needed at bedtime for sleep., Disp: , Rfl:   Allergies   Allergen Reactions    Penicillins Unknown       Objective     /54   Pulse 62   Temp 35.8 °C (96.4 °F)       CONSTITUTIONAL:  No acute distress     MENTAL STATUS:  Awake, alert, oriented to self, but not to place or time, with poor short-term memory, fair awareness of recent events, normal attention span, concentration, and fund of knowledge.     SPEECH AND LANGUAGE:  Dysnomia, dysfluent speech, but can follow most commands, has difficulty with complex commands, has no dysarthria     CRANIAL NERVES:  II-Vision present, visual fields full to confrontational testing     III/IV/VI--EOMs are present in all directions.  Pupils are symmetrically reactive in dim light.  No ptosis.     V--Normal facial sensation.     VII--No facial asymmetry.     VIII--Hearing present to voice bilaterally.     IX/X--Symmetric soft palate rise.     XI--Normal trapezius power on the left, 4/5 right.     XII--Tongue protrudes without deviation.     MOTOR:  Diffuse right arm weakness: 4/5 deltoid and biceps, 3/5 triceps, 2/5 finger ext, 3/5 finger flex/, diffuse 4/5 right leg power. Hypertonia of the right limbs.      SENSORY:  Present pin sensation in both arms and both legs.     COORDINATION:  Normal finger-to-nose and heel-to-shin testing in both arms and both legs.     REFLEXES are brisk at the right biceps, triceps, brachioradialis, patella, normal on the left, depressed at both ankles.  The plantar responses are flexor.     GAIT stable with minimal assistance of one.      Crispin Marte Jr., M.D., [facility]

## 2025-02-03 DIAGNOSIS — R51.9 NONINTRACTABLE HEADACHE, UNSPECIFIED CHRONICITY PATTERN, UNSPECIFIED HEADACHE TYPE: Primary | ICD-10-CM

## 2025-02-03 RX ORDER — PREGABALIN 150 MG/1
150 CAPSULE ORAL 3 TIMES DAILY
Qty: 90 CAPSULE | Refills: 3 | Status: SHIPPED | OUTPATIENT
Start: 2025-02-03 | End: 2025-06-03

## 2025-04-28 ENCOUNTER — APPOINTMENT (OUTPATIENT)
Dept: CARDIOLOGY | Facility: HOSPITAL | Age: 70
End: 2025-04-28
Payer: COMMERCIAL

## 2025-04-28 ENCOUNTER — APPOINTMENT (OUTPATIENT)
Dept: RADIOLOGY | Facility: HOSPITAL | Age: 70
End: 2025-04-28
Payer: COMMERCIAL

## 2025-04-28 ENCOUNTER — HOSPITAL ENCOUNTER (EMERGENCY)
Facility: HOSPITAL | Age: 70
Discharge: HOME | End: 2025-04-29
Attending: EMERGENCY MEDICINE
Payer: COMMERCIAL
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
## 2025-04-28 LAB
ALBUMIN SERPL BCP-MCNC: 3.9 G/DL (ref 3.4–5)
ALP SERPL-CCNC: 96 U/L (ref 33–136)
ALT SERPL W P-5'-P-CCNC: 3 U/L (ref 7–45)
ANION GAP BLDV CALCULATED.4IONS-SCNC: 10 MMOL/L (ref 10–25)
ANION GAP SERPL CALC-SCNC: 13 MMOL/L (ref 10–20)
APTT PPP: 31 SECONDS (ref 26–36)
AST SERPL W P-5'-P-CCNC: 15 U/L (ref 9–39)
BASE EXCESS BLDV CALC-SCNC: 2.5 MMOL/L (ref -2–3)
BASOPHILS # BLD AUTO: 0.02 X10*3/UL (ref 0–0.1)
BASOPHILS NFR BLD AUTO: 0.4 %
BILIRUB SERPL-MCNC: 0.3 MG/DL (ref 0–1.2)
BODY TEMPERATURE: 37 DEGREES CELSIUS
BUN SERPL-MCNC: 16 MG/DL (ref 6–23)
CA-I BLDV-SCNC: 1.16 MMOL/L (ref 1.1–1.33)
CALCIUM SERPL-MCNC: 9.2 MG/DL (ref 8.6–10.3)
CARDIAC TROPONIN I PNL SERPL HS: 5 NG/L (ref 0–13)
CHLORIDE BLDV-SCNC: 108 MMOL/L (ref 98–107)
CHLORIDE SERPL-SCNC: 109 MMOL/L (ref 98–107)
CO2 SERPL-SCNC: 26 MMOL/L (ref 21–32)
CREAT SERPL-MCNC: 0.97 MG/DL (ref 0.5–1.05)
EGFRCR SERPLBLD CKD-EPI 2021: 63 ML/MIN/1.73M*2
EOSINOPHIL # BLD AUTO: 0.22 X10*3/UL (ref 0–0.7)
EOSINOPHIL NFR BLD AUTO: 4.4 %
ERYTHROCYTE [DISTWIDTH] IN BLOOD BY AUTOMATED COUNT: 13.1 % (ref 11.5–14.5)
GLUCOSE BLDV-MCNC: 142 MG/DL (ref 74–99)
GLUCOSE SERPL-MCNC: 150 MG/DL (ref 74–99)
HCO3 BLDV-SCNC: 30.5 MMOL/L (ref 22–26)
HCT VFR BLD AUTO: 38.1 % (ref 36–46)
HCT VFR BLD EST: 38 % (ref 36–46)
HGB BLD-MCNC: 12.4 G/DL (ref 12–16)
HGB BLDV-MCNC: 12.8 G/DL (ref 12–16)
IMM GRANULOCYTES # BLD AUTO: 0.01 X10*3/UL (ref 0–0.7)
IMM GRANULOCYTES NFR BLD AUTO: 0.2 % (ref 0–0.9)
INHALED O2 CONCENTRATION: 22 %
INR PPP: 1 (ref 0.9–1.1)
LACTATE BLDV-SCNC: 1.7 MMOL/L (ref 0.4–2)
LYMPHOCYTES # BLD AUTO: 3.1 X10*3/UL (ref 1.2–4.8)
LYMPHOCYTES NFR BLD AUTO: 62.1 %
MAGNESIUM SERPL-MCNC: 2.22 MG/DL (ref 1.6–2.4)
MCH RBC QN AUTO: 30.8 PG (ref 26–34)
MCHC RBC AUTO-ENTMCNC: 32.5 G/DL (ref 32–36)
MCV RBC AUTO: 95 FL (ref 80–100)
MONOCYTES # BLD AUTO: 0.29 X10*3/UL (ref 0.1–1)
MONOCYTES NFR BLD AUTO: 5.8 %
NEUTROPHILS # BLD AUTO: 1.35 X10*3/UL (ref 1.2–7.7)
NEUTROPHILS NFR BLD AUTO: 27.1 %
NRBC BLD-RTO: 0 /100 WBCS (ref 0–0)
OXYHGB MFR BLDV: 71 % (ref 45–75)
PCO2 BLDV: 62 MM HG (ref 41–51)
PH BLDV: 7.3 PH (ref 7.33–7.43)
PLATELET # BLD AUTO: 146 X10*3/UL (ref 150–450)
PO2 BLDV: 45 MM HG (ref 35–45)
POTASSIUM BLDV-SCNC: 4.1 MMOL/L (ref 3.5–5.3)
POTASSIUM SERPL-SCNC: 4.1 MMOL/L (ref 3.5–5.3)
PROT SERPL-MCNC: 7.1 G/DL (ref 6.4–8.2)
PROTHROMBIN TIME: 10.7 SECONDS (ref 9.8–12.4)
RBC # BLD AUTO: 4.03 X10*6/UL (ref 4–5.2)
SAO2 % BLDV: 72 % (ref 45–75)
SODIUM BLDV-SCNC: 144 MMOL/L (ref 136–145)
SODIUM SERPL-SCNC: 144 MMOL/L (ref 136–145)
WBC # BLD AUTO: 5 X10*3/UL (ref 4.4–11.3)

## 2025-04-28 PROCEDURE — 73552 X-RAY EXAM OF FEMUR 2/>: CPT | Mod: LT

## 2025-04-28 PROCEDURE — 73590 X-RAY EXAM OF LOWER LEG: CPT | Mod: RT

## 2025-04-28 PROCEDURE — 82435 ASSAY OF BLOOD CHLORIDE: CPT | Mod: 59 | Performed by: EMERGENCY MEDICINE

## 2025-04-28 PROCEDURE — 74177 CT ABD & PELVIS W/CONTRAST: CPT | Performed by: RADIOLOGY

## 2025-04-28 PROCEDURE — 36415 COLL VENOUS BLD VENIPUNCTURE: CPT | Performed by: EMERGENCY MEDICINE

## 2025-04-28 PROCEDURE — 99285 EMERGENCY DEPT VISIT HI MDM: CPT | Mod: 25 | Performed by: EMERGENCY MEDICINE

## 2025-04-28 PROCEDURE — 73552 X-RAY EXAM OF FEMUR 2/>: CPT | Mod: RT

## 2025-04-28 PROCEDURE — 72125 CT NECK SPINE W/O DYE: CPT

## 2025-04-28 PROCEDURE — 71045 X-RAY EXAM CHEST 1 VIEW: CPT

## 2025-04-28 PROCEDURE — 72131 CT LUMBAR SPINE W/O DYE: CPT | Performed by: RADIOLOGY

## 2025-04-28 PROCEDURE — 83735 ASSAY OF MAGNESIUM: CPT | Performed by: EMERGENCY MEDICINE

## 2025-04-28 PROCEDURE — 72125 CT NECK SPINE W/O DYE: CPT | Performed by: RADIOLOGY

## 2025-04-28 PROCEDURE — 93005 ELECTROCARDIOGRAM TRACING: CPT

## 2025-04-28 PROCEDURE — 73523 X-RAY EXAM HIPS BI 5/> VIEWS: CPT

## 2025-04-28 PROCEDURE — 85730 THROMBOPLASTIN TIME PARTIAL: CPT | Performed by: EMERGENCY MEDICINE

## 2025-04-28 PROCEDURE — 71260 CT THORAX DX C+: CPT | Performed by: RADIOLOGY

## 2025-04-28 PROCEDURE — 70450 CT HEAD/BRAIN W/O DYE: CPT | Performed by: RADIOLOGY

## 2025-04-28 PROCEDURE — 2550000001 HC RX 255 CONTRASTS: Mod: JZ | Performed by: EMERGENCY MEDICINE

## 2025-04-28 PROCEDURE — 85610 PROTHROMBIN TIME: CPT | Performed by: EMERGENCY MEDICINE

## 2025-04-28 PROCEDURE — 74177 CT ABD & PELVIS W/CONTRAST: CPT

## 2025-04-28 PROCEDURE — 72128 CT CHEST SPINE W/O DYE: CPT | Performed by: RADIOLOGY

## 2025-04-28 PROCEDURE — 84484 ASSAY OF TROPONIN QUANT: CPT | Performed by: EMERGENCY MEDICINE

## 2025-04-28 PROCEDURE — 73590 X-RAY EXAM OF LOWER LEG: CPT | Mod: LT

## 2025-04-28 PROCEDURE — 70450 CT HEAD/BRAIN W/O DYE: CPT

## 2025-04-28 PROCEDURE — 85025 COMPLETE CBC W/AUTO DIFF WBC: CPT | Performed by: EMERGENCY MEDICINE

## 2025-04-28 PROCEDURE — 82435 ASSAY OF BLOOD CHLORIDE: CPT | Performed by: EMERGENCY MEDICINE

## 2025-04-28 RX ADMIN — IOHEXOL 90 ML: 350 INJECTION, SOLUTION INTRAVENOUS at 23:42

## 2025-04-28 ASSESSMENT — PAIN - FUNCTIONAL ASSESSMENT: PAIN_FUNCTIONAL_ASSESSMENT: FLACC (FACE, LEGS, ACTIVITY, CRY, CONSOLABILITY)

## 2025-04-28 ASSESSMENT — COLUMBIA-SUICIDE SEVERITY RATING SCALE - C-SSRS
1. IN THE PAST MONTH, HAVE YOU WISHED YOU WERE DEAD OR WISHED YOU COULD GO TO SLEEP AND NOT WAKE UP?: NO
6. HAVE YOU EVER DONE ANYTHING, STARTED TO DO ANYTHING, OR PREPARED TO DO ANYTHING TO END YOUR LIFE?: NO
2. HAVE YOU ACTUALLY HAD ANY THOUGHTS OF KILLING YOURSELF?: NO

## 2025-04-29 VITALS
OXYGEN SATURATION: 95 % | BODY MASS INDEX: 30.01 KG/M2 | TEMPERATURE: 97.5 F | WEIGHT: 158.73 LBS | RESPIRATION RATE: 14 BRPM | SYSTOLIC BLOOD PRESSURE: 162 MMHG | HEART RATE: 55 BPM | DIASTOLIC BLOOD PRESSURE: 69 MMHG

## 2025-04-29 LAB
ATRIAL RATE: 62 BPM
CARDIAC TROPONIN I PNL SERPL HS: 4 NG/L (ref 0–13)
P AXIS: 83 DEGREES
P OFFSET: 201 MS
P ONSET: 141 MS
PR INTERVAL: 164 MS
Q ONSET: 223 MS
QRS COUNT: 11 BEATS
QRS DURATION: 86 MS
QT INTERVAL: 446 MS
QTC CALCULATION(BAZETT): 452 MS
QTC FREDERICIA: 451 MS
R AXIS: 63 DEGREES
T AXIS: 41 DEGREES
T OFFSET: 446 MS
VENTRICULAR RATE: 62 BPM

## 2025-04-29 NOTE — ED PROVIDER NOTES
Emergency Department Provider Note             History of Present Illness   CC: Fall    History provided by: Patient and EMS  Limitations to History:  confusion, aphasia, dysarthria  External Records Reviewed: prior ED provider notes, clinic notes, discharge summaries    HPI:  Darwin Castro is a 70 y.o. female with history including CVA with residual aphasia, hypertension, hyperlipidemia, anxiety, depression, bipolar disorder, seizures presenting to the emergency department via EMS for an unwitnessed fall at her nursing facility.  Per report, she was found on the ground. Unknown if she lost consciousness. No witnessed seizure activity. Neuro status at baseline. Staff reported that she was well prior to this. The patient reports bilateral leg pain, back pain, headache. She's on aspirin but no anticoagulation.  She denies recent fever, chills, chest pain, shortness of breath, cough, nausea, vomiting, abdominal pain, urinary symptoms.     Physical Exam   Triage vitals:  T 36.4 °C (97.5 °F)  HR 57  /60  RR 12  O2 96 % None (Room air)    General: awake, chronically ill-appearing, no distress, covered in chips  Head: normocephalic, atraumatic  Eyes: pupils equal, no conjunctival injection or scleral icterus  ENT: nares patent, moist mucous membranes, midface stable   Neck: supple, trachea midline, no masses, no C-spine tenderness  CV: regular rate and rhythm, well-perfused  Resp: breathing is non-labored, speaking in full sentences. Lungs are clear to auscultation bilaterally  GI: soft, non-distended, non-tender, no rebound or guarding  Extremities: no edema, no gross deformity, tenderness to bilateral thighs and lower leg without particular focality, contracture to right arm  Back: no spinal tenderness   Neuro: alert, oriented to self and place, follows basic commands, extraocular movements intact, visual fields grossly intact, no drift in upper extremities, drift in bilateral lower extremities, mild-moderate  dysarthria, expressive aphasia, face is symmetric, sensation intact, no extinction, no obvious dysmetria - assessment somewhat limited  Psych: Appropriate mood and affect    ED Course & Medical Decision Making     70 y.o. female with history including CVA with residual aphasia, hypertension, hyperlipidemia, anxiety, depression, bipolar disorder, seizures presenting to the emergency department via EMS for an unwitnessed fall at her nursing facility.  She is unable to provide details of the fall.  She is oriented x 2 and has dysphagia and mild aphasia, which is reportedly her baseline.  She has tenderness to her lower extremities.  Given her fall with limited history, pan CT was ordered in addition to x-rays of her lower extremity. See ED course.     She remains overall stable.  I do not feel that admission is indicated at this time.  Family at bedside confirmed that this is her baseline. She resides at a facility and can continue to be monitored there. She was given return precautions and advised follow up with her doctor.     EKG: See ED course.     Results: Independently reviewed and interpreted by me. Please see ED course and [facility] for my full interpretation.     Chronic Medical Conditions Significantly Affecting Care: as per [facility]    Patient was discussed with the following consultants/services: None     Care Considerations: as per [facility]    ED Course as of 04/29/25 0319   Mon Apr 28, 2025 2120 EKG per my interpretation was normal sinus rhythm, rate 62, normal axis, normal intervals, no ST elevation/depression, subtle T wave abnormality primarily in inferior leads [LM]   2121 I independently reviewed her chest x-ray which shows no significant acute process. [LM]   2301 Venous full panel shows very mild respiratory acidosis. [LM]   2302 I independently reviewed her lower extremity x-rays which show no acute fracture. [LM]   e Apr 29, 2025   0131 CT shows mild pancreatic duct dilation but no significant traumatic  injuries. There are stable post-op changes of left crani.  [LM]      ED Course User Index  [LM] Alondra Hair MD         Diagnoses as of 04/29/25 0319   Fall, initial encounter       Disposition   Discharge to her SNF    MD Alondra Haq MD  04/29/25 0325

## 2025-04-29 NOTE — DISCHARGE INSTRUCTIONS
You were seen in the ED after a fall. Your labs and imaging did not show any significant findings that need urgent workup. There is slight dilation of your pancreatic duct on your CT -- discuss this with your primary doctor. We feel that it is safe for you to go back to your facility. Monitor symptoms closely. Return to the ED with worsening symptoms.

## 2025-04-29 NOTE — ED TRIAGE NOTES
Pt BIBA for a unwitnessed fall. Pt has a hx of a stroke with deficits including aphasia. C/o leg pain, back pain, and headache.

## 2025-05-07 ENCOUNTER — APPOINTMENT (OUTPATIENT)
Dept: RADIOLOGY | Facility: HOSPITAL | Age: 70
End: 2025-05-07
Payer: COMMERCIAL

## 2025-05-07 ENCOUNTER — APPOINTMENT (OUTPATIENT)
Dept: CARDIOLOGY | Facility: HOSPITAL | Age: 70
End: 2025-05-07
Payer: COMMERCIAL

## 2025-05-07 ENCOUNTER — HOSPITAL ENCOUNTER (OUTPATIENT)
Facility: HOSPITAL | Age: 70
Setting detail: OBSERVATION
Discharge: NURSING, REHAB OR LONG-TERM CARE | End: 2025-05-09
Attending: EMERGENCY MEDICINE | Admitting: INTERNAL MEDICINE
Payer: COMMERCIAL

## 2025-05-07 DIAGNOSIS — F31.10 BIPOLAR DISORDER, CURRENT EPISODE MANIC WITHOUT PSYCHOTIC FEATURES, UNSPECIFIED (MULTI): ICD-10-CM

## 2025-05-07 DIAGNOSIS — D64.9 SYMPTOMATIC ANEMIA: Primary | ICD-10-CM

## 2025-05-07 DIAGNOSIS — I10 ESSENTIAL (PRIMARY) HYPERTENSION: ICD-10-CM

## 2025-05-07 LAB
ALBUMIN SERPL BCP-MCNC: 3.6 G/DL (ref 3.4–5)
ALP SERPL-CCNC: 79 U/L (ref 33–136)
ALT SERPL W P-5'-P-CCNC: 3 U/L (ref 7–45)
ANION GAP SERPL CALC-SCNC: 10 MMOL/L (ref 10–20)
APPEARANCE UR: CLEAR
APTT PPP: 31 SECONDS (ref 26–36)
AST SERPL W P-5'-P-CCNC: 19 U/L (ref 9–39)
BASOPHILS # BLD AUTO: 0.02 X10*3/UL (ref 0–0.1)
BASOPHILS NFR BLD AUTO: 0.6 %
BILIRUB SERPL-MCNC: 0.4 MG/DL (ref 0–1.2)
BILIRUB UR STRIP.AUTO-MCNC: NEGATIVE MG/DL
BUN SERPL-MCNC: 21 MG/DL (ref 6–23)
CALCIUM SERPL-MCNC: 8.5 MG/DL (ref 8.6–10.3)
CARDIAC TROPONIN I PNL SERPL HS: 3 NG/L (ref 0–13)
CHLORIDE SERPL-SCNC: 105 MMOL/L (ref 98–107)
CO2 SERPL-SCNC: 32 MMOL/L (ref 21–32)
COLOR UR: YELLOW
CREAT SERPL-MCNC: 1.02 MG/DL (ref 0.5–1.05)
EGFRCR SERPLBLD CKD-EPI 2021: 59 ML/MIN/1.73M*2
EOSINOPHIL # BLD AUTO: 0.16 X10*3/UL (ref 0–0.7)
EOSINOPHIL NFR BLD AUTO: 4.5 %
ERYTHROCYTE [DISTWIDTH] IN BLOOD BY AUTOMATED COUNT: 13.1 % (ref 11.5–14.5)
FLUAV RNA RESP QL NAA+PROBE: NOT DETECTED
FLUBV RNA RESP QL NAA+PROBE: NOT DETECTED
GLUCOSE BLD MANUAL STRIP-MCNC: 140 MG/DL (ref 74–99)
GLUCOSE BLD MANUAL STRIP-MCNC: 95 MG/DL (ref 74–99)
GLUCOSE SERPL-MCNC: 57 MG/DL (ref 74–99)
GLUCOSE UR STRIP.AUTO-MCNC: NORMAL MG/DL
HCT VFR BLD AUTO: 29.5 % (ref 36–46)
HGB BLD-MCNC: 9.3 G/DL (ref 12–16)
IMM GRANULOCYTES # BLD AUTO: 0.01 X10*3/UL (ref 0–0.7)
IMM GRANULOCYTES NFR BLD AUTO: 0.3 % (ref 0–0.9)
INR PPP: 1 (ref 0.9–1.1)
KETONES UR STRIP.AUTO-MCNC: NEGATIVE MG/DL
LEUKOCYTE ESTERASE UR QL STRIP.AUTO: NEGATIVE
LEVETIRACETAM SERPL-MCNC: 31 UG/ML (ref 10–40)
LYMPHOCYTES # BLD AUTO: 2.01 X10*3/UL (ref 1.2–4.8)
LYMPHOCYTES NFR BLD AUTO: 56.1 %
MCH RBC QN AUTO: 30.5 PG (ref 26–34)
MCHC RBC AUTO-ENTMCNC: 31.5 G/DL (ref 32–36)
MCV RBC AUTO: 97 FL (ref 80–100)
MONOCYTES # BLD AUTO: 0.27 X10*3/UL (ref 0.1–1)
MONOCYTES NFR BLD AUTO: 7.5 %
NEUTROPHILS # BLD AUTO: 1.11 X10*3/UL (ref 1.2–7.7)
NEUTROPHILS NFR BLD AUTO: 31 %
NITRITE UR QL STRIP.AUTO: NEGATIVE
NRBC BLD-RTO: 0 /100 WBCS (ref 0–0)
PH UR STRIP.AUTO: 6 [PH]
PLATELET # BLD AUTO: 140 X10*3/UL (ref 150–450)
POTASSIUM SERPL-SCNC: 5.2 MMOL/L (ref 3.5–5.3)
PROT SERPL-MCNC: 6.4 G/DL (ref 6.4–8.2)
PROT UR STRIP.AUTO-MCNC: NEGATIVE MG/DL
PROTHROMBIN TIME: 11.3 SECONDS (ref 9.8–12.4)
RBC # BLD AUTO: 3.05 X10*6/UL (ref 4–5.2)
RBC # UR STRIP.AUTO: NEGATIVE MG/DL
RSV RNA RESP QL NAA+PROBE: NOT DETECTED
SARS-COV-2 RNA RESP QL NAA+PROBE: NOT DETECTED
SODIUM SERPL-SCNC: 142 MMOL/L (ref 136–145)
SP GR UR STRIP.AUTO: >1.05
TSH SERPL-ACNC: 1.4 MIU/L (ref 0.44–3.98)
UROBILINOGEN UR STRIP.AUTO-MCNC: NORMAL MG/DL
VALPROATE SERPL-MCNC: 46 UG/ML (ref 50–100)
WBC # BLD AUTO: 3.6 X10*3/UL (ref 4.4–11.3)

## 2025-05-07 PROCEDURE — 84443 ASSAY THYROID STIM HORMONE: CPT | Performed by: EMERGENCY MEDICINE

## 2025-05-07 PROCEDURE — 80177 DRUG SCRN QUAN LEVETIRACETAM: CPT | Mod: AHULAB | Performed by: EMERGENCY MEDICINE

## 2025-05-07 PROCEDURE — 85730 THROMBOPLASTIN TIME PARTIAL: CPT | Performed by: EMERGENCY MEDICINE

## 2025-05-07 PROCEDURE — 51798 US URINE CAPACITY MEASURE: CPT

## 2025-05-07 PROCEDURE — 87637 SARSCOV2&INF A&B&RSV AMP PRB: CPT | Performed by: EMERGENCY MEDICINE

## 2025-05-07 PROCEDURE — 70496 CT ANGIOGRAPHY HEAD: CPT

## 2025-05-07 PROCEDURE — 70450 CT HEAD/BRAIN W/O DYE: CPT

## 2025-05-07 PROCEDURE — 70498 CT ANGIOGRAPHY NECK: CPT | Performed by: RADIOLOGY

## 2025-05-07 PROCEDURE — 36415 COLL VENOUS BLD VENIPUNCTURE: CPT | Performed by: EMERGENCY MEDICINE

## 2025-05-07 PROCEDURE — 80053 COMPREHEN METABOLIC PANEL: CPT | Performed by: EMERGENCY MEDICINE

## 2025-05-07 PROCEDURE — 82947 ASSAY GLUCOSE BLOOD QUANT: CPT

## 2025-05-07 PROCEDURE — 2550000001 HC RX 255 CONTRASTS: Performed by: EMERGENCY MEDICINE

## 2025-05-07 PROCEDURE — 2500000004 HC RX 250 GENERAL PHARMACY W/ HCPCS (ALT 636 FOR OP/ED): Mod: JZ | Performed by: EMERGENCY MEDICINE

## 2025-05-07 PROCEDURE — 96374 THER/PROPH/DIAG INJ IV PUSH: CPT | Mod: 59

## 2025-05-07 PROCEDURE — 85610 PROTHROMBIN TIME: CPT | Performed by: EMERGENCY MEDICINE

## 2025-05-07 PROCEDURE — 70496 CT ANGIOGRAPHY HEAD: CPT | Performed by: RADIOLOGY

## 2025-05-07 PROCEDURE — 71045 X-RAY EXAM CHEST 1 VIEW: CPT

## 2025-05-07 PROCEDURE — 84484 ASSAY OF TROPONIN QUANT: CPT | Performed by: EMERGENCY MEDICINE

## 2025-05-07 PROCEDURE — 83540 ASSAY OF IRON: CPT

## 2025-05-07 PROCEDURE — 85025 COMPLETE CBC W/AUTO DIFF WBC: CPT | Performed by: EMERGENCY MEDICINE

## 2025-05-07 PROCEDURE — 82728 ASSAY OF FERRITIN: CPT

## 2025-05-07 PROCEDURE — 71045 X-RAY EXAM CHEST 1 VIEW: CPT | Performed by: RADIOLOGY

## 2025-05-07 PROCEDURE — 99285 EMERGENCY DEPT VISIT HI MDM: CPT | Mod: 25 | Performed by: EMERGENCY MEDICINE

## 2025-05-07 PROCEDURE — G0378 HOSPITAL OBSERVATION PER HR: HCPCS

## 2025-05-07 PROCEDURE — 93005 ELECTROCARDIOGRAM TRACING: CPT

## 2025-05-07 PROCEDURE — 80164 ASSAY DIPROPYLACETIC ACD TOT: CPT | Performed by: EMERGENCY MEDICINE

## 2025-05-07 PROCEDURE — G0427 INPT/ED TELECONSULT70: HCPCS | Performed by: STUDENT IN AN ORGANIZED HEALTH CARE EDUCATION/TRAINING PROGRAM

## 2025-05-07 PROCEDURE — 70450 CT HEAD/BRAIN W/O DYE: CPT | Performed by: STUDENT IN AN ORGANIZED HEALTH CARE EDUCATION/TRAINING PROGRAM

## 2025-05-07 PROCEDURE — 81003 URINALYSIS AUTO W/O SCOPE: CPT | Performed by: EMERGENCY MEDICINE

## 2025-05-07 RX ORDER — PANTOPRAZOLE SODIUM 40 MG/10ML
40 INJECTION, POWDER, LYOPHILIZED, FOR SOLUTION INTRAVENOUS ONCE
Status: COMPLETED | OUTPATIENT
Start: 2025-05-07 | End: 2025-05-07

## 2025-05-07 RX ADMIN — IOHEXOL 75 ML: 350 INJECTION, SOLUTION INTRAVENOUS at 15:45

## 2025-05-07 RX ADMIN — PANTOPRAZOLE SODIUM 40 MG: 40 INJECTION, POWDER, FOR SOLUTION INTRAVENOUS at 20:49

## 2025-05-07 ASSESSMENT — PAIN - FUNCTIONAL ASSESSMENT: PAIN_FUNCTIONAL_ASSESSMENT: UNABLE TO SELF-REPORT

## 2025-05-07 NOTE — ED TRIAGE NOTES
Pt brought in from [facility] for AMS and weakness. PT was A&Ox2. PT has a Hx of TIA in 2023 with deficits.

## 2025-05-07 NOTE — ED PROVIDER NOTES
History/Exam limitations: due to condition.   Additional history was obtained from patient, EMS personnel, and nursing home.    HPI:    Darwin Castro is a 70 y.o. female PMH PMH epilepsy with complex partial seizures, prior CVA with resultant aphasia, Parkinson disease on carbidopa levodopa, headaches presenting for evaluation of altered mentation.  Patient reportedly had a fall yesterday.  Today they noted that she seemed more weak and her primary care provider sent her in for evaluation.  The fall happened sometime yesterday afternoon and she reportedly appeared well yesterday evening.  Patient Nuys any headache, chest pain, shortness with, abdominal pain.  Is oriented to time self/hospital.  Is more lethargic than baseline reportedly.  Unknown last known well specifically.  However was reportedly well when she went to bed last night.     Most recent ED note with no lateralizing deficits.  Does have a neurology note in the past with right side deficits however no facial asymmetry, does have subtle right facial weakness today     Last Known Well Time: unknown, suspected prior to bed last night      Physical Exam:  ED Triage Vitals   Temp Pulse Resp BP   -- -- -- --      SpO2 Temp src Heart Rate Source Patient Position   -- -- -- --      BP Location FiO2 (%)     -- --        GEN:      Alert, NAD  Eyes:       PERRL, EOMI  HENT:      NC/AT, OP clear, airway patent, MM  CV:      RRR, no MRG, no LE pitting edema, 2+ radial and pedal pulses  PULM:     CTAB, no w/r/r, easy WOB, symmetric chest rise  ABD:      Soft, NT, ND, no masses, BS +  :       No CVA TTP  NEURO:   See NIHSS below, A/Ox self/hospital, subtle right lower facial weakness able to be overcome when smiling, CN II-XII intact, drifting off to bed in the right upper and lower extremities, no drift in the left upper or lower extremity, SILT, FNF normal b/l, no pronator drift  MSK:      FROM, no joint deformities or swelling, no e/o trauma  SKIN:       Warm  and dry  PSYCH:    Appropriate mood and affect        Interval: Baseline  Time: 3:21 PM  Person Administering Scale: Chris Antunez MD     1a  Level of consciousness: 0=alert; keenly responsive   1b. LOC questions:  0=Performs both tasks correctly   1c. LOC commands: 1=Performs one task correctly   2.  Best Gaze: 0=normal   3. Visual: 0=No visual loss   4. Facial Palsy: 1=Minor paralysis (flattened nasolabial fold, asymmetric on smiling)   5a. Motor left arm: 0=No drift, limb holds 90 (or 45) degrees for full 10 seconds   5b.  Motor right arm: 1=Drift, limb holds 90 (or 45) degrees but drifts down before full 10 seconds: does not hit bed   6a. motor left le=No drift, limb holds 90 (or 45) degrees for full 10 seconds   6b  Motor right le=Drift, limb holds 90 (or 45) degrees but drifts down before full 10 seconds: does not hit bed   7. Limb Ataxia: 0=Absent   8.  Sensory: 0=Normal; no sensory loss   9. Best Language:  1=Mild to moderate aphasia; some obvious loss of fluency or facility of comprehension without significant limitation on ideas expressed or form of expression.   10. Dysarthria: 1=Mild to moderate, patient slurs at least some words and at worst, can be understood with some difficulty   11. Extinction and Inattention: 0=No abnormality   12. Distal motor function: 0=Normal     Total:   6         VAN: VAN: Positive          MDM/ED Course:   Darwin Casrto is a 70 y.o. female PMH PMH epilepsy with complex partial seizures, prior CVA with resultant aphasia, Parkinson disease on carbidopa levodopa, headaches presenting for evaluation of altered mentation.  Afebrile, vitals reassuring, heart rate 50s which appears to be patient's baseline.  Differential for altered mental status is extensive but includes intracranial pathology or bleed, ACS, seizure disorder in post-ictal state, infectious/inflammatory etiology, electrolyte abnormality, significant blood count anomaly most likely in the setting of  significant anemia, thyroid dysfunction, intoxication.  Glucose reassuring.  IV placed and labs drawn.  Stroke alert was initiated on my initial assessment due to potentially new right facial droop, overall constellation of symptoms.  Otherwise appears to be near her neurologic baseline.  Labs obtained CBC with a new anemia with hemoglobin 9.3, normocytic, 9 days ago hemoglobin was 12.4.  TSH normal.  Troponin negative.  Keppra level and valproic acid level sent.  RSV COVID flu negative.  Chemistry displays a low glucose at 57, confirmed to be normal with fingerstick on repeat.  Urinalysis reassuring.  CT angio head and neck and CT brain obtained and no LVO, no intracranial hemorrhage.  Was evaluated by stroke team and lower suspicion for acute CVA.  Patient's mental status appeared to improve in the ED.  She does report black stools recently.  Rectal exam performed with RN chaperone and no stool was able to be obtained.  Patient given IV Protonix.  Given significant decrease in hemoglobin, patient benefit from hospitalization for further monitoring and trending of hemoglobin.  Unclear etiology for acute encephalopathy.  No evidence of infectious process.  Patient has no nuchal rigidity, no fever, no leukocytosis, lower likelihood for meningitis at this time.  Patient was accepted by her PCP Dr. Cobb for admission in stable condition.    EKG reviewed by me: 4:44 PM sinus bradycardia, rate 54, normal axis, normal intervals, no STEMI, similar to prior EKGs.    ED Course as of 05/09/25 1037   Wed May 07, 2025   1809 Glucose reassuring arrival however her serum glucose is low.  Will recheck. [JM]   2146 MELONY with no blood.  Patient does state that she is having black stools recently.  Protonix was given.  Hemodynamically stable. [JM]      ED Course User Index  [JM] Chris Antunez MD         Diagnoses as of 05/09/25 1037   Symptomatic anemia       Chronic medical conditions affecting care listed in MDM. I  independently reviewed imaging studies and agreed with radiology reads. I reviewed recent and relevant outside records including PCP notes, Prior discharge summaries, and prior radiology reports.      IV Thrombolysis:    No Thrombolysis contraindication reason: Working diagnosis is NOT a suspected ischemic stroke and Time from Last Known Well (or stroke onset) is >4.5 hours     Procedure  Procedures     Diagnosis:   1.  Encephalopathy  2.  Anemia    Dispo: Hospitalized in stable condition      Disclaimer: Portions of this note were dictated by speech recognition. An attempt at proof reading was made to minimize errors. Minor errors in transcription may be present.  Please call if questions.     Chris Antunez MD  05/09/25 4147

## 2025-05-07 NOTE — CONSULTS
Inpatient consult to Neuro TeleStroke  Consult performed by: Marlon Santos MD  Consult ordered by: Chris Antunez MD        Virtual Visit start time: 407pm    History Of Present Illness:  Historian: Patient and ED Provider   Darwin Castro is a 70 y.o. female came to ED for confusion, not being at baseline. She had a fall yesterday at nursing home, unsure if it was witnessed. Since this morning, staff felt patient was more confused that baseline, prompting ED visit.    Pt reports she came to ED as the nurse and doctor told her so. Has baseline expressive aphasia, limiting history.   .  Last known well: 5/6/2025  Had stroke symptoms resolved at time of presentation: Yes   Current antiplatelet/anticoagulant use: Aspirin    Prior Functional Status (Modified [place] Scale):  3 The patient has moderate disability; required some external help but able to walk without the assistance of another individual.     Stroke Risk Factors:  Atrial Fibrillation and Previous H/O Ischemic Stroke    Last Recorded Vitals:  Blood pressure (!) 105/47, pulse 60, temperature 36.1 °C (97 °F), temperature source Temporal, resp. rate 17, height 1.524 m (5'), weight 72.1 kg (159 lb), SpO2 98%.     NIHSS:   NIH Stroke Scale:     1A. Level of Consciousness:  Alert (keenly responsive) (0)    1B. Ask Month and Age:  1 question right (+1)    1C. Blink Eyes & Squeeze Hands:  Performs both tasks (0)    2. Best Gaze:  Normal (0)    4. Facial Palsy:  Normal symmetry (0)    5A. Motor - Left Arm:  No drift (0)    5B. Motor - Right Arm:  Drift (+1)    6A. Motor - Left Leg:  No drift (0)    6B. Motor - Right Leg:  Some effort against gravity (+2)    7. Limb Ataxia:  No ataxia (0)    8. Sensory Loss:  Normal (no sensory loss) (0)    9. Best Language:  Mild-moderate aphasia (+1)    10. Dysarthia:  Mild-moderate dysarthria (+1)    11. Extinction and Inattention:  No abnormality (0)         Relevant Results:  LABS:  INR   Date Value Ref Range Status    05/07/2025 1.0 0.9 - 1.1 Final      Results for orders placed or performed during the hospital encounter of 05/07/25 (from the past 24 hours)   POCT GLUCOSE   Result Value Ref Range    POCT Glucose 140 (H) 74 - 99 mg/dL   CBC and Auto Differential   Result Value Ref Range    WBC 3.6 (L) 4.4 - 11.3 x10*3/uL    nRBC 0.0 0.0 - 0.0 /100 WBCs    RBC 3.05 (L) 4.00 - 5.20 x10*6/uL    Hemoglobin 9.3 (L) 12.0 - 16.0 g/dL    Hematocrit 29.5 (L) 36.0 - 46.0 %    MCV 97 80 - 100 fL    MCH 30.5 26.0 - 34.0 pg    MCHC 31.5 (L) 32.0 - 36.0 g/dL    RDW 13.1 11.5 - 14.5 %    Platelets 140 (L) 150 - 450 x10*3/uL    Neutrophils % 31.0 40.0 - 80.0 %    Immature Granulocytes %, Automated 0.3 0.0 - 0.9 %    Lymphocytes % 56.1 13.0 - 44.0 %    Monocytes % 7.5 2.0 - 10.0 %    Eosinophils % 4.5 0.0 - 6.0 %    Basophils % 0.6 0.0 - 2.0 %    Neutrophils Absolute 1.11 (L) 1.20 - 7.70 x10*3/uL    Immature Granulocytes Absolute, Automated 0.01 0.00 - 0.70 x10*3/uL    Lymphocytes Absolute 2.01 1.20 - 4.80 x10*3/uL    Monocytes Absolute 0.27 0.10 - 1.00 x10*3/uL    Eosinophils Absolute 0.16 0.00 - 0.70 x10*3/uL    Basophils Absolute 0.02 0.00 - 0.10 x10*3/uL   Troponin I, High Sensitivity   Result Value Ref Range    Troponin I, High Sensitivity 3 0 - 13 ng/L   Protime-INR   Result Value Ref Range    Protime 11.3 9.8 - 12.4 seconds    INR 1.0 0.9 - 1.1   APTT   Result Value Ref Range    aPTT 31 26 - 36 seconds        CT Head Imaging:  CTH imaging personally reviewed, showed no acute ischemic / hemorrhagic changes     CTA Head and Neck Imaging:  CTA imaging personally reviewed, other findings Left M2 stenosis - chronic      Diagnosis:  Stroke not suspected, alternative etiology likely     Assessment/Plan:  70 y o with h/o previous stroke, residual expressive aphasia and R hemiparesis, Parkinson's disease, focal epilepsy brought to ED for increased confusion than baseline.     History is limited due to baseline expressive aphasia. bUt based on  chart review, pt with no new deficits at this time to suspect recurrent acute ischemic stroke  Currently no new deficits to suspect recurrent ischemic stroke.     IV Thrombolysis IV Thrombolysis Checklist        IV Thrombolysis Given: No; Thrombolysis contraindication reason: Working diagnosis is NOT a suspected ischemic stroke and Time from Last Known Well (or stroke onset) is >4.5 hours           Patient is a candidate for thrombectomy:  yes/no: No; contraindication reason: No evidence of proximal occlusion    Additional Recommendations:  Infectious/ metabolic work up.     Disposition:  Patient will remain at referring facility for further evaluation and management.    Virtual or Telephone Consent    An interactive audio and video telecommunication system which permits real time communications between the patient (at the originating site) and provider (at the distant site) was utilized to provide this telehealth service.   Verbal consent was requested and obtained from Darwin Castro on this date, 05/07/25 for a telehealth visit.      Telestroke is covered in shift work. If there are further Neurological questions or concerns please contact your regional neurologist on call during daytime hours or contact the transfer center with an ADT20 order.    Marlon Santos MD

## 2025-05-08 ENCOUNTER — APPOINTMENT (OUTPATIENT)
Dept: RADIOLOGY | Facility: HOSPITAL | Age: 70
End: 2025-05-08
Payer: COMMERCIAL

## 2025-05-08 LAB
ALBUMIN SERPL BCP-MCNC: 3.8 G/DL (ref 3.4–5)
ALP SERPL-CCNC: 82 U/L (ref 33–136)
ALT SERPL W P-5'-P-CCNC: 9 U/L (ref 7–45)
ANION GAP SERPL CALC-SCNC: 14 MMOL/L (ref 10–20)
AST SERPL W P-5'-P-CCNC: 16 U/L (ref 9–39)
BILIRUB SERPL-MCNC: 0.3 MG/DL (ref 0–1.2)
BUN SERPL-MCNC: 17 MG/DL (ref 6–23)
CALCIUM SERPL-MCNC: 9 MG/DL (ref 8.6–10.3)
CHLORIDE SERPL-SCNC: 108 MMOL/L (ref 98–107)
CO2 SERPL-SCNC: 24 MMOL/L (ref 21–32)
CREAT SERPL-MCNC: 0.86 MG/DL (ref 0.5–1.05)
EGFRCR SERPLBLD CKD-EPI 2021: 73 ML/MIN/1.73M*2
ERYTHROCYTE [DISTWIDTH] IN BLOOD BY AUTOMATED COUNT: 13 % (ref 11.5–14.5)
FERRITIN SERPL-MCNC: 106 NG/ML (ref 8–150)
GLUCOSE BLD MANUAL STRIP-MCNC: 123 MG/DL (ref 74–99)
GLUCOSE BLD MANUAL STRIP-MCNC: 129 MG/DL (ref 74–99)
GLUCOSE BLD MANUAL STRIP-MCNC: 137 MG/DL (ref 74–99)
GLUCOSE BLD MANUAL STRIP-MCNC: 140 MG/DL (ref 74–99)
GLUCOSE SERPL-MCNC: 138 MG/DL (ref 74–99)
HCT VFR BLD AUTO: 41.4 % (ref 36–46)
HGB BLD-MCNC: 13.2 G/DL (ref 12–16)
HOLD SPECIMEN: NORMAL
IRON SATN MFR SERPL: 20 % (ref 25–45)
IRON SERPL-MCNC: 49 UG/DL (ref 35–150)
MCH RBC QN AUTO: 30.7 PG (ref 26–34)
MCHC RBC AUTO-ENTMCNC: 31.9 G/DL (ref 32–36)
MCV RBC AUTO: 96 FL (ref 80–100)
NRBC BLD-RTO: 0 /100 WBCS (ref 0–0)
PLATELET # BLD AUTO: 92 X10*3/UL (ref 150–450)
POTASSIUM SERPL-SCNC: 4.1 MMOL/L (ref 3.5–5.3)
PROT SERPL-MCNC: 6.8 G/DL (ref 6.4–8.2)
RBC # BLD AUTO: 4.3 X10*6/UL (ref 4–5.2)
SODIUM SERPL-SCNC: 142 MMOL/L (ref 136–145)
TIBC SERPL-MCNC: 241 UG/DL (ref 240–445)
UIBC SERPL-MCNC: 192 UG/DL (ref 110–370)
WBC # BLD AUTO: 4.6 X10*3/UL (ref 4.4–11.3)

## 2025-05-08 PROCEDURE — 74177 CT ABD & PELVIS W/CONTRAST: CPT

## 2025-05-08 PROCEDURE — 71275 CT ANGIOGRAPHY CHEST: CPT | Performed by: RADIOLOGY

## 2025-05-08 PROCEDURE — 71275 CT ANGIOGRAPHY CHEST: CPT

## 2025-05-08 PROCEDURE — 2500000001 HC RX 250 WO HCPCS SELF ADMINISTERED DRUGS (ALT 637 FOR MEDICARE OP): Performed by: INTERNAL MEDICINE

## 2025-05-08 PROCEDURE — 2500000001 HC RX 250 WO HCPCS SELF ADMINISTERED DRUGS (ALT 637 FOR MEDICARE OP)

## 2025-05-08 PROCEDURE — 96372 THER/PROPH/DIAG INJ SC/IM: CPT

## 2025-05-08 PROCEDURE — 80053 COMPREHEN METABOLIC PANEL: CPT

## 2025-05-08 PROCEDURE — G0378 HOSPITAL OBSERVATION PER HR: HCPCS

## 2025-05-08 PROCEDURE — 36415 COLL VENOUS BLD VENIPUNCTURE: CPT

## 2025-05-08 PROCEDURE — 85027 COMPLETE CBC AUTOMATED: CPT

## 2025-05-08 PROCEDURE — 2550000001 HC RX 255 CONTRASTS: Mod: JZ | Performed by: INTERNAL MEDICINE

## 2025-05-08 PROCEDURE — 82947 ASSAY GLUCOSE BLOOD QUANT: CPT | Mod: 91

## 2025-05-08 PROCEDURE — 74177 CT ABD & PELVIS W/CONTRAST: CPT | Performed by: RADIOLOGY

## 2025-05-08 PROCEDURE — 2500000004 HC RX 250 GENERAL PHARMACY W/ HCPCS (ALT 636 FOR OP/ED)

## 2025-05-08 RX ORDER — DICLOFENAC SODIUM 10 MG/G
4 GEL TOPICAL 2 TIMES DAILY PRN
COMMUNITY

## 2025-05-08 RX ORDER — CHOLECALCIFEROL (VITAMIN D3) 25 MCG
50 TABLET ORAL DAILY
Status: DISCONTINUED | OUTPATIENT
Start: 2025-05-08 | End: 2025-05-09 | Stop reason: HOSPADM

## 2025-05-08 RX ORDER — CITALOPRAM 10 MG/1
30 TABLET ORAL DAILY
Status: DISCONTINUED | OUTPATIENT
Start: 2025-05-08 | End: 2025-05-09 | Stop reason: HOSPADM

## 2025-05-08 RX ORDER — POLYETHYLENE GLYCOL 3350 17 G/17G
17 POWDER, FOR SOLUTION ORAL DAILY
Status: DISCONTINUED | OUTPATIENT
Start: 2025-05-08 | End: 2025-05-09 | Stop reason: HOSPADM

## 2025-05-08 RX ORDER — PREGABALIN 100 MG/1
100 CAPSULE ORAL 3 TIMES DAILY
Status: DISCONTINUED | OUTPATIENT
Start: 2025-05-08 | End: 2025-05-09 | Stop reason: HOSPADM

## 2025-05-08 RX ORDER — DEXTROSE 50 % IN WATER (D50W) INTRAVENOUS SYRINGE
25
Status: DISCONTINUED | OUTPATIENT
Start: 2025-05-08 | End: 2025-05-09 | Stop reason: HOSPADM

## 2025-05-08 RX ORDER — DOCUSATE SODIUM 100 MG/1
100 CAPSULE, LIQUID FILLED ORAL 2 TIMES DAILY
Status: DISCONTINUED | OUTPATIENT
Start: 2025-05-08 | End: 2025-05-09 | Stop reason: HOSPADM

## 2025-05-08 RX ORDER — CARBIDOPA AND LEVODOPA 25; 100 MG/1; MG/1
2 TABLET ORAL 3 TIMES DAILY
Status: DISCONTINUED | OUTPATIENT
Start: 2025-05-08 | End: 2025-05-09 | Stop reason: HOSPADM

## 2025-05-08 RX ORDER — METOPROLOL TARTRATE 25 MG/1
25 TABLET, FILM COATED ORAL 2 TIMES DAILY
Status: DISCONTINUED | OUTPATIENT
Start: 2025-05-08 | End: 2025-05-08

## 2025-05-08 RX ORDER — BUSPIRONE HYDROCHLORIDE 10 MG/1
10 TABLET ORAL 3 TIMES DAILY
Status: DISCONTINUED | OUTPATIENT
Start: 2025-05-08 | End: 2025-05-09 | Stop reason: HOSPADM

## 2025-05-08 RX ORDER — ASPIRIN 81 MG/1
81 TABLET ORAL DAILY
Status: DISCONTINUED | OUTPATIENT
Start: 2025-05-08 | End: 2025-05-09 | Stop reason: HOSPADM

## 2025-05-08 RX ORDER — PRAZOSIN HYDROCHLORIDE 1 MG/1
1 CAPSULE ORAL NIGHTLY
Status: DISCONTINUED | OUTPATIENT
Start: 2025-05-08 | End: 2025-05-09 | Stop reason: HOSPADM

## 2025-05-08 RX ORDER — LEVETIRACETAM 500 MG/1
500 TABLET ORAL 2 TIMES DAILY
Status: DISCONTINUED | OUTPATIENT
Start: 2025-05-08 | End: 2025-05-09 | Stop reason: HOSPADM

## 2025-05-08 RX ORDER — GABAPENTIN 400 MG/1
800 CAPSULE ORAL 3 TIMES DAILY
Status: DISCONTINUED | OUTPATIENT
Start: 2025-05-08 | End: 2025-05-08

## 2025-05-08 RX ORDER — MULTIVIT-MIN/IRON FUM/FOLIC AC 7.5 MG-4
1 TABLET ORAL DAILY
COMMUNITY

## 2025-05-08 RX ORDER — BUSPIRONE HYDROCHLORIDE 10 MG/1
10 TABLET ORAL 2 TIMES DAILY
Status: DISCONTINUED | OUTPATIENT
Start: 2025-05-08 | End: 2025-05-08

## 2025-05-08 RX ORDER — PREGABALIN 75 MG/1
150 CAPSULE ORAL 3 TIMES DAILY
Status: DISCONTINUED | OUTPATIENT
Start: 2025-05-08 | End: 2025-05-08 | Stop reason: SDUPTHER

## 2025-05-08 RX ORDER — DIVALPROEX SODIUM 125 MG/1
125 CAPSULE, COATED PELLETS ORAL 2 TIMES DAILY
Status: DISCONTINUED | OUTPATIENT
Start: 2025-05-08 | End: 2025-05-09 | Stop reason: HOSPADM

## 2025-05-08 RX ORDER — ENOXAPARIN SODIUM 100 MG/ML
40 INJECTION SUBCUTANEOUS
Status: DISCONTINUED | OUTPATIENT
Start: 2025-05-08 | End: 2025-05-09 | Stop reason: HOSPADM

## 2025-05-08 RX ORDER — AMLODIPINE BESYLATE 5 MG/1
5 TABLET ORAL DAILY
Status: DISCONTINUED | OUTPATIENT
Start: 2025-05-08 | End: 2025-05-09 | Stop reason: HOSPADM

## 2025-05-08 RX ORDER — PREGABALIN 75 MG/1
150 CAPSULE ORAL 3 TIMES DAILY
Status: DISCONTINUED | OUTPATIENT
Start: 2025-05-08 | End: 2025-05-08

## 2025-05-08 RX ORDER — CLONAZEPAM 0.5 MG/1
0.5 TABLET ORAL 3 TIMES DAILY
Status: DISCONTINUED | OUTPATIENT
Start: 2025-05-08 | End: 2025-05-09 | Stop reason: HOSPADM

## 2025-05-08 RX ORDER — DEXTROSE 50 % IN WATER (D50W) INTRAVENOUS SYRINGE
12.5
Status: DISCONTINUED | OUTPATIENT
Start: 2025-05-08 | End: 2025-05-09 | Stop reason: HOSPADM

## 2025-05-08 RX ORDER — ATORVASTATIN CALCIUM 40 MG/1
40 TABLET, FILM COATED ORAL NIGHTLY
Status: DISCONTINUED | OUTPATIENT
Start: 2025-05-08 | End: 2025-05-08 | Stop reason: WASHOUT

## 2025-05-08 RX ORDER — ACETAMINOPHEN 325 MG/1
650 TABLET ORAL EVERY 4 HOURS PRN
Status: DISCONTINUED | OUTPATIENT
Start: 2025-05-08 | End: 2025-05-09 | Stop reason: HOSPADM

## 2025-05-08 RX ADMIN — CLONAZEPAM 0.5 MG: 0.5 TABLET ORAL at 10:30

## 2025-05-08 RX ADMIN — LEVETIRACETAM 500 MG: 500 TABLET, FILM COATED ORAL at 10:31

## 2025-05-08 RX ADMIN — CLONAZEPAM 0.5 MG: 0.5 TABLET ORAL at 15:45

## 2025-05-08 RX ADMIN — BUSPIRONE HYDROCHLORIDE 10 MG: 10 TABLET ORAL at 02:31

## 2025-05-08 RX ADMIN — BUSPIRONE HYDROCHLORIDE 10 MG: 10 TABLET ORAL at 10:28

## 2025-05-08 RX ADMIN — DIVALPROEX SODIUM 125 MG: 125 CAPSULE ORAL at 02:31

## 2025-05-08 RX ADMIN — DOCUSATE SODIUM 100 MG: 100 CAPSULE, LIQUID FILLED ORAL at 20:12

## 2025-05-08 RX ADMIN — LEVETIRACETAM 500 MG: 500 TABLET, FILM COATED ORAL at 20:13

## 2025-05-08 RX ADMIN — DIVALPROEX SODIUM 125 MG: 125 CAPSULE ORAL at 10:32

## 2025-05-08 RX ADMIN — DIVALPROEX SODIUM 125 MG: 125 CAPSULE ORAL at 20:13

## 2025-05-08 RX ADMIN — CLONAZEPAM 0.5 MG: 0.5 TABLET ORAL at 20:13

## 2025-05-08 RX ADMIN — CARBIDOPA AND LEVODOPA 2 TABLET: 25; 100 TABLET ORAL at 20:12

## 2025-05-08 RX ADMIN — PREGABALIN 100 MG: 100 CAPSULE ORAL at 20:13

## 2025-05-08 RX ADMIN — PRAZOSIN HYDROCHLORIDE 1 MG: 1 CAPSULE ORAL at 20:12

## 2025-05-08 RX ADMIN — CARBIDOPA AND LEVODOPA 2 TABLET: 25; 100 TABLET ORAL at 10:29

## 2025-05-08 RX ADMIN — CARBIDOPA AND LEVODOPA 2 TABLET: 25; 100 TABLET ORAL at 15:45

## 2025-05-08 RX ADMIN — BUSPIRONE HYDROCHLORIDE 10 MG: 10 TABLET ORAL at 15:45

## 2025-05-08 RX ADMIN — ASPIRIN 81 MG: 81 TABLET, COATED ORAL at 10:27

## 2025-05-08 RX ADMIN — POLYETHYLENE GLYCOL 3350 17 G: 17 POWDER, FOR SOLUTION ORAL at 10:33

## 2025-05-08 RX ADMIN — PREGABALIN 100 MG: 100 CAPSULE ORAL at 15:45

## 2025-05-08 RX ADMIN — AMLODIPINE BESYLATE 5 MG: 5 TABLET ORAL at 10:26

## 2025-05-08 RX ADMIN — BUSPIRONE HYDROCHLORIDE 10 MG: 10 TABLET ORAL at 20:13

## 2025-05-08 RX ADMIN — Medication 50 MCG: at 10:29

## 2025-05-08 RX ADMIN — ENOXAPARIN SODIUM 40 MG: 40 INJECTION SUBCUTANEOUS at 10:33

## 2025-05-08 RX ADMIN — IOHEXOL 90 ML: 350 INJECTION, SOLUTION INTRAVENOUS at 08:52

## 2025-05-08 RX ADMIN — DOCUSATE SODIUM 100 MG: 100 CAPSULE, LIQUID FILLED ORAL at 02:31

## 2025-05-08 RX ADMIN — DOCUSATE SODIUM 100 MG: 100 CAPSULE, LIQUID FILLED ORAL at 10:32

## 2025-05-08 RX ADMIN — CITALOPRAM HYDROBROMIDE 30 MG: 10 TABLET ORAL at 10:31

## 2025-05-08 SDOH — SOCIAL STABILITY: SOCIAL INSECURITY
WITHIN THE LAST YEAR, HAVE YOU BEEN RAPED OR FORCED TO HAVE ANY KIND OF SEXUAL ACTIVITY BY YOUR PARTNER OR EX-PARTNER?: NO

## 2025-05-08 SDOH — ECONOMIC STABILITY: INCOME INSECURITY: IN THE PAST 12 MONTHS HAS THE ELECTRIC, GAS, OIL, OR WATER COMPANY THREATENED TO SHUT OFF SERVICES IN YOUR HOME?: NO

## 2025-05-08 SDOH — SOCIAL STABILITY: SOCIAL INSECURITY: DO YOU FEEL ANYONE HAS EXPLOITED OR TAKEN ADVANTAGE OF YOU FINANCIALLY OR OF YOUR PERSONAL PROPERTY?: NO

## 2025-05-08 SDOH — SOCIAL STABILITY: SOCIAL INSECURITY: WERE YOU ABLE TO COMPLETE ALL THE BEHAVIORAL HEALTH SCREENINGS?: YES

## 2025-05-08 SDOH — SOCIAL STABILITY: SOCIAL INSECURITY: WITHIN THE LAST YEAR, HAVE YOU BEEN AFRAID OF YOUR PARTNER OR EX-PARTNER?: NO

## 2025-05-08 SDOH — ECONOMIC STABILITY: FOOD INSECURITY: WITHIN THE PAST 12 MONTHS, THE FOOD YOU BOUGHT JUST DIDN'T LAST AND YOU DIDN'T HAVE MONEY TO GET MORE.: NEVER TRUE

## 2025-05-08 SDOH — SOCIAL STABILITY: SOCIAL INSECURITY
WITHIN THE LAST YEAR, HAVE YOU BEEN KICKED, HIT, SLAPPED, OR OTHERWISE PHYSICALLY HURT BY YOUR PARTNER OR EX-PARTNER?: NO

## 2025-05-08 SDOH — SOCIAL STABILITY: SOCIAL INSECURITY: HAVE YOU HAD THOUGHTS OF HARMING ANYONE ELSE?: NO

## 2025-05-08 SDOH — ECONOMIC STABILITY: FOOD INSECURITY: WITHIN THE PAST 12 MONTHS, YOU WORRIED THAT YOUR FOOD WOULD RUN OUT BEFORE YOU GOT THE MONEY TO BUY MORE.: NEVER TRUE

## 2025-05-08 SDOH — SOCIAL STABILITY: SOCIAL INSECURITY: HAVE YOU HAD ANY THOUGHTS OF HARMING ANYONE ELSE?: NO

## 2025-05-08 SDOH — SOCIAL STABILITY: SOCIAL INSECURITY: ARE YOU OR HAVE YOU BEEN THREATENED OR ABUSED PHYSICALLY, EMOTIONALLY, OR SEXUALLY BY ANYONE?: NO

## 2025-05-08 SDOH — SOCIAL STABILITY: SOCIAL INSECURITY: HAS ANYONE EVER THREATENED TO HURT YOUR FAMILY OR YOUR PETS?: NO

## 2025-05-08 SDOH — SOCIAL STABILITY: SOCIAL INSECURITY: WITHIN THE LAST YEAR, HAVE YOU BEEN HUMILIATED OR EMOTIONALLY ABUSED IN OTHER WAYS BY YOUR PARTNER OR EX-PARTNER?: NO

## 2025-05-08 SDOH — SOCIAL STABILITY: SOCIAL INSECURITY: DO YOU FEEL UNSAFE GOING BACK TO THE PLACE WHERE YOU ARE LIVING?: NO

## 2025-05-08 SDOH — SOCIAL STABILITY: SOCIAL INSECURITY: ABUSE: ADULT

## 2025-05-08 SDOH — SOCIAL STABILITY: SOCIAL INSECURITY: ARE THERE ANY APPARENT SIGNS OF INJURIES/BEHAVIORS THAT COULD BE RELATED TO ABUSE/NEGLECT?: NO

## 2025-05-08 SDOH — SOCIAL STABILITY: SOCIAL INSECURITY: DOES ANYONE TRY TO KEEP YOU FROM HAVING/CONTACTING OTHER FRIENDS OR DOING THINGS OUTSIDE YOUR HOME?: NO

## 2025-05-08 ASSESSMENT — ACTIVITIES OF DAILY LIVING (ADL)
LACK_OF_TRANSPORTATION: PATIENT UNABLE TO ANSWER
WALKS IN HOME: DEPENDENT
DRESSING YOURSELF: DEPENDENT
TOILETING: DEPENDENT
ADEQUATE_TO_COMPLETE_ADL: YES
HEARING - RIGHT EAR: DIFFICULTY WITH NOISE
FEEDING YOURSELF: DEPENDENT
PATIENT'S MEMORY ADEQUATE TO SAFELY COMPLETE DAILY ACTIVITIES?: NO
HEARING - LEFT EAR: DIFFICULTY WITH NOISE
BATHING: DEPENDENT
LACK_OF_TRANSPORTATION: PATIENT UNABLE TO ANSWER
GROOMING: DEPENDENT
JUDGMENT_ADEQUATE_SAFELY_COMPLETE_DAILY_ACTIVITIES: NO

## 2025-05-08 ASSESSMENT — PATIENT HEALTH QUESTIONNAIRE - PHQ9
SUM OF ALL RESPONSES TO PHQ9 QUESTIONS 1 & 2: 0
2. FEELING DOWN, DEPRESSED OR HOPELESS: NOT AT ALL
1. LITTLE INTEREST OR PLEASURE IN DOING THINGS: NOT AT ALL

## 2025-05-08 ASSESSMENT — PAIN SCALES - GENERAL
PAINLEVEL_OUTOF10: 0 - NO PAIN
PAINLEVEL_OUTOF10: 0 - NO PAIN

## 2025-05-08 ASSESSMENT — COGNITIVE AND FUNCTIONAL STATUS - GENERAL
STANDING UP FROM CHAIR USING ARMS: A LITTLE
TOILETING: A LITTLE
CLIMB 3 TO 5 STEPS WITH RAILING: TOTAL
EATING MEALS: A LITTLE
MOBILITY SCORE: 16
PATIENT BASELINE BEDBOUND: UNABLE TO ASSESS AT THIS TIME
MOVING TO AND FROM BED TO CHAIR: A LITTLE
HELP NEEDED FOR BATHING: A LITTLE
MOVING FROM LYING ON BACK TO SITTING ON SIDE OF FLAT BED WITH BEDRAILS: A LITTLE
WALKING IN HOSPITAL ROOM: A LOT
DRESSING REGULAR UPPER BODY CLOTHING: A LITTLE
MOBILITY SCORE: 13
TURNING FROM BACK TO SIDE WHILE IN FLAT BAD: A LITTLE
MOVING FROM LYING ON BACK TO SITTING ON SIDE OF FLAT BED WITH BEDRAILS: A LITTLE
DAILY ACTIVITIY SCORE: 18
DAILY ACTIVITIY SCORE: 18
DRESSING REGULAR LOWER BODY CLOTHING: A LITTLE
TURNING FROM BACK TO SIDE WHILE IN FLAT BAD: A LITTLE
CLIMB 3 TO 5 STEPS WITH RAILING: A LOT
STANDING UP FROM CHAIR USING ARMS: A LOT
EATING MEALS: A LITTLE
DRESSING REGULAR LOWER BODY CLOTHING: A LITTLE
TOILETING: A LITTLE
MOVING TO AND FROM BED TO CHAIR: A LOT
HELP NEEDED FOR BATHING: A LITTLE
WALKING IN HOSPITAL ROOM: A LOT
DRESSING REGULAR UPPER BODY CLOTHING: A LITTLE
PERSONAL GROOMING: A LITTLE
PERSONAL GROOMING: A LITTLE

## 2025-05-08 ASSESSMENT — ENCOUNTER SYMPTOMS
UNEXPECTED WEIGHT CHANGE: 0
LIGHT-HEADEDNESS: 0
SEIZURES: 1
DYSURIA: 0
FEVER: 0
APPETITE CHANGE: 0
ARTHRALGIAS: 1
DIZZINESS: 0
WEAKNESS: 1
BACK PAIN: 1
DIFFICULTY URINATING: 0
ACTIVITY CHANGE: 0
ADENOPATHY: 0
FACIAL ASYMMETRY: 0
APNEA: 0
FATIGUE: 1
DIAPHORESIS: 0
ABDOMINAL PAIN: 0
ABDOMINAL DISTENTION: 0
CHEST TIGHTNESS: 0
EYE DISCHARGE: 0
CHILLS: 0
NUMBNESS: 0
CHOKING: 0

## 2025-05-08 ASSESSMENT — PAIN SCALES - PAIN ASSESSMENT IN ADVANCED DEMENTIA (PAINAD)
BREATHING: NORMAL
CONSOLABILITY: NO NEED TO CONSOLE
FACIALEXPRESSION: SMILING OR INEXPRESSIVE
TOTALSCORE: 0
BODYLANGUAGE: RELAXED

## 2025-05-08 ASSESSMENT — LIFESTYLE VARIABLES
AUDIT-C TOTAL SCORE: 0
SKIP TO QUESTIONS 9-10: 1
HOW OFTEN DO YOU HAVE A DRINK CONTAINING ALCOHOL: NEVER
HOW OFTEN DO YOU HAVE 6 OR MORE DRINKS ON ONE OCCASION: NEVER
HOW MANY STANDARD DRINKS CONTAINING ALCOHOL DO YOU HAVE ON A TYPICAL DAY: PATIENT DOES NOT DRINK
AUDIT-C TOTAL SCORE: 0

## 2025-05-08 ASSESSMENT — PAIN - FUNCTIONAL ASSESSMENT: PAIN_FUNCTIONAL_ASSESSMENT: PAINAD (PAIN ASSESSMENT IN ADVANCED DEMENTIA SCALE)

## 2025-05-08 NOTE — CARE PLAN
The patient's goals for the shift include  remaining comfortable    The clinical goals for the shift include prevent fall injury

## 2025-05-08 NOTE — H&P
History Of Present Illness  Darwin Castro is a 70 y.o. female presenting with mental status per the staff.  70-year-old female, who is known to have history of CVA, with residual right hemiparesis, aphasia, Parkinson's, partial symptomatic epilepsy, sees a neurologist as an outpatient, migraine, history of substance abuse in the past, she is usually wheelchair-bound, lately she was found to be more lethargic than her usual, her psych meds were adjusted, yet still still lethargic so she was sent to the emergency department, she had a stroke protocol done no evidence of stroke, she is admitted here for further management.  She was also diagnosed with lower hemoglobin of 9 but today the hemoglobin is back to normal, there is no bleeding,.  .     Past Medical History  She has no past medical history on file.    Surgical History  She has a past surgical history that includes Other surgical history (10/15/2019).     Social History  She reports that she has never smoked. She has never used smokeless tobacco. She reports that she does not drink alcohol and does not use drugs.    Family History  Family History[1]     Allergies  Penicillins    Review of Systems   Constitutional:  Positive for fatigue. Negative for activity change, appetite change, chills, diaphoresis, fever and unexpected weight change.   Eyes:  Negative for discharge.   Respiratory:  Negative for apnea, choking and chest tightness.    Cardiovascular:  Negative for chest pain and leg swelling.   Gastrointestinal:  Negative for abdominal distention and abdominal pain.   Endocrine: Negative for cold intolerance and heat intolerance.   Genitourinary:  Negative for difficulty urinating and dysuria.   Musculoskeletal:  Positive for arthralgias, back pain and gait problem.   Neurological:  Positive for seizures and weakness. Negative for dizziness, facial asymmetry, light-headedness and numbness.   Hematological:  Negative for adenopathy.        Physical  Exam  Constitutional:       Appearance: Normal appearance.   HENT:      Head: Normocephalic and atraumatic.   Cardiovascular:      Rate and Rhythm: Normal rate and regular rhythm.      Heart sounds: No murmur heard.     No friction rub.   Pulmonary:      Effort: No respiratory distress.      Breath sounds: No stridor.   Abdominal:      General: There is no distension.      Palpations: There is no mass.   Musculoskeletal:         General: No swelling.      Cervical back: Normal range of motion and neck supple.   Skin:     Coloration: Skin is not jaundiced.   Neurological:      Mental Status: She is alert and oriented to person, place, and time.      Motor: Weakness present.      Gait: Gait abnormal.   Psychiatric:         Mood and Affect: Mood normal.          Last Recorded Vitals  Blood pressure 148/78, pulse 73, temperature 36.4 °C (97.5 °F), temperature source Temporal, resp. rate 18, height 1.524 m (5'), weight 72.1 kg (159 lb), SpO2 97%.    Relevant Results  Results for orders placed or performed during the hospital encounter of 05/07/25 (from the past 96 hours)   POCT GLUCOSE   Result Value Ref Range    POCT Glucose 140 (H) 74 - 99 mg/dL   CBC and Auto Differential   Result Value Ref Range    WBC 3.6 (L) 4.4 - 11.3 x10*3/uL    nRBC 0.0 0.0 - 0.0 /100 WBCs    RBC 3.05 (L) 4.00 - 5.20 x10*6/uL    Hemoglobin 9.3 (L) 12.0 - 16.0 g/dL    Hematocrit 29.5 (L) 36.0 - 46.0 %    MCV 97 80 - 100 fL    MCH 30.5 26.0 - 34.0 pg    MCHC 31.5 (L) 32.0 - 36.0 g/dL    RDW 13.1 11.5 - 14.5 %    Platelets 140 (L) 150 - 450 x10*3/uL    Neutrophils % 31.0 40.0 - 80.0 %    Immature Granulocytes %, Automated 0.3 0.0 - 0.9 %    Lymphocytes % 56.1 13.0 - 44.0 %    Monocytes % 7.5 2.0 - 10.0 %    Eosinophils % 4.5 0.0 - 6.0 %    Basophils % 0.6 0.0 - 2.0 %    Neutrophils Absolute 1.11 (L) 1.20 - 7.70 x10*3/uL    Immature Granulocytes Absolute, Automated 0.01 0.00 - 0.70 x10*3/uL    Lymphocytes Absolute 2.01 1.20 - 4.80 x10*3/uL     Monocytes Absolute 0.27 0.10 - 1.00 x10*3/uL    Eosinophils Absolute 0.16 0.00 - 0.70 x10*3/uL    Basophils Absolute 0.02 0.00 - 0.10 x10*3/uL   Troponin I, High Sensitivity   Result Value Ref Range    Troponin I, High Sensitivity 3 0 - 13 ng/L   Protime-INR   Result Value Ref Range    Protime 11.3 9.8 - 12.4 seconds    INR 1.0 0.9 - 1.1   APTT   Result Value Ref Range    aPTT 31 26 - 36 seconds   Levetiracetam   Result Value Ref Range    Keppra 31 10 - 40 ug/mL   TSH with reflex to Free T4 if abnormal   Result Value Ref Range    Thyroid Stimulating Hormone 1.40 0.44 - 3.98 mIU/L   Sars-CoV-2 and Influenza A/B PCR   Result Value Ref Range    Flu A Result Not Detected Not Detected    Flu B Result Not Detected Not Detected    Coronavirus 2019, PCR Not Detected Not Detected   RSV PCR   Result Value Ref Range    RSV PCR Not Detected Not Detected   ECG 12 lead   Result Value Ref Range    Ventricular Rate 54 BPM    Atrial Rate 54 BPM    WA Interval 162 ms    QRS Duration 80 ms    QT Interval 434 ms    QTC Calculation(Bazett) 411 ms    P Axis 73 degrees    R Axis 29 degrees    T Axis 22 degrees    QRS Count 9 beats    Q Onset 223 ms    P Onset 142 ms    P Offset 201 ms    T Offset 440 ms    QTC Fredericia 418 ms   Comprehensive metabolic panel   Result Value Ref Range    Glucose 57 (L) 74 - 99 mg/dL    Sodium 142 136 - 145 mmol/L    Potassium 5.2 3.5 - 5.3 mmol/L    Chloride 105 98 - 107 mmol/L    Bicarbonate 32 21 - 32 mmol/L    Anion Gap 10 10 - 20 mmol/L    Urea Nitrogen 21 6 - 23 mg/dL    Creatinine 1.02 0.50 - 1.05 mg/dL    eGFR 59 (L) >60 mL/min/1.73m*2    Calcium 8.5 (L) 8.6 - 10.3 mg/dL    Albumin 3.6 3.4 - 5.0 g/dL    Alkaline Phosphatase 79 33 - 136 U/L    Total Protein 6.4 6.4 - 8.2 g/dL    AST 19 9 - 39 U/L    Bilirubin, Total 0.4 0.0 - 1.2 mg/dL    ALT 3 (L) 7 - 45 U/L   Valproic Acid   Result Value Ref Range    Valproic Acid 46 (L) 50 - 100 ug/mL   Iron and TIBC   Result Value Ref Range    Iron 49 35 - 150  ug/dL    UIBC 192 110 - 370 ug/dL    TIBC 241 240 - 445 ug/dL    % Saturation 20 (L) 25 - 45 %   Ferritin   Result Value Ref Range    Ferritin 106 8 - 150 ng/mL   Urinalysis with Reflex Culture and Microscopic   Result Value Ref Range    Color, Urine Yellow Light-Yellow, Yellow, Dark-Yellow    Appearance, Urine Clear Clear    Specific Gravity, Urine >1.050 (N) 1.005 - 1.035    pH, Urine 6.0 5.0, 5.5, 6.0, 6.5, 7.0, 7.5, 8.0    Protein, Urine NEGATIVE NEGATIVE, 10 (TRACE), 20 (TRACE) mg/dL    Glucose, Urine Normal Normal mg/dL    Blood, Urine NEGATIVE NEGATIVE mg/dL    Ketones, Urine NEGATIVE NEGATIVE mg/dL    Bilirubin, Urine NEGATIVE NEGATIVE mg/dL    Urobilinogen, Urine Normal Normal mg/dL    Nitrite, Urine NEGATIVE NEGATIVE    Leukocyte Esterase, Urine NEGATIVE NEGATIVE   Extra Urine Gray Tube   Result Value Ref Range    Extra Tube Hold for add-ons.    POCT GLUCOSE   Result Value Ref Range    POCT Glucose 95 74 - 99 mg/dL   CBC   Result Value Ref Range    WBC 4.6 4.4 - 11.3 x10*3/uL    nRBC 0.0 0.0 - 0.0 /100 WBCs    RBC 4.30 4.00 - 5.20 x10*6/uL    Hemoglobin 13.2 12.0 - 16.0 g/dL    Hematocrit 41.4 36.0 - 46.0 %    MCV 96 80 - 100 fL    MCH 30.7 26.0 - 34.0 pg    MCHC 31.9 (L) 32.0 - 36.0 g/dL    RDW 13.0 11.5 - 14.5 %    Platelets 92 (L) 150 - 450 x10*3/uL   Comprehensive metabolic panel   Result Value Ref Range    Glucose 138 (H) 74 - 99 mg/dL    Sodium 142 136 - 145 mmol/L    Potassium 4.1 3.5 - 5.3 mmol/L    Chloride 108 (H) 98 - 107 mmol/L    Bicarbonate 24 21 - 32 mmol/L    Anion Gap 14 10 - 20 mmol/L    Urea Nitrogen 17 6 - 23 mg/dL    Creatinine 0.86 0.50 - 1.05 mg/dL    eGFR 73 >60 mL/min/1.73m*2    Calcium 9.0 8.6 - 10.3 mg/dL    Albumin 3.8 3.4 - 5.0 g/dL    Alkaline Phosphatase 82 33 - 136 U/L    Total Protein 6.8 6.4 - 8.2 g/dL    AST 16 9 - 39 U/L    Bilirubin, Total 0.3 0.0 - 1.2 mg/dL    ALT 9 7 - 45 U/L   POCT GLUCOSE   Result Value Ref Range    POCT Glucose 129 (H) 74 - 99 mg/dL   POCT GLUCOSE    Result Value Ref Range    POCT Glucose 123 (H) 74 - 99 mg/dL   POCT GLUCOSE   Result Value Ref Range    POCT Glucose 140 (H) 74 - 99 mg/dL      CT abdomen pelvis w IV contrast  Result Date: 5/8/2025  Interpreted By:  Juan Hernández, STUDY: CT ABDOMEN PELVIS W IV CONTRAST;  5/8/2025 8:50 am   INDICATION: 71 y/o   F with  Signs/Symptoms:encephalopathy.     LIMITATIONS: None.   ACCESSION NUMBER(S): [id number]   ORDERING CLINICIAN: RANDY FARIAS   TECHNIQUE: After the administration of   oral water and IV nonionic contrast, spiral axial images were obtained from the xiphoid down through the symphysis pubis. Sagittal and coronal reconstruction images were generated. Bone, mediastinal, lung, and liver windows were reviewed. Omnipaque 350   90 ML   COMPARISON: Most recent prior is from 04/28/2025.. Correlation also with CT pulmonary angiogram  done just prior to this exam.   FINDINGS:     LIVER: No hepatomegaly. Liver density was  within the limits of normal. No  liver lesion evident in this  exam.   GALLBLADDER: Mild cholelithiasis. Somewhat contracted gallbladder. No gallbladder wall thickening, or adjacent edema.   BILE DUCTS: No intrahepatic biliary ductal dilatation.  Common bile duct was within the limits of normal.   SPLEEN: No splenomegaly. No splenic mass..   PANCREAS: No pancreatic mass or inflammation, or ductal dilatation.   KIDNEYS/ADRENALS: No adrenal mass or enlargement. No calcified stone, hydronephrosis, mass, or perinephric edema in either kidney. No ureteral stone or dilatation.   BLADDER/PELVIS: Urinary bladder was grossly intact. Retroflexed uterus. There are several scattered calcified fibroids measuring up to 17 mm in diameter. 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 No adnexal mass.   GREAT VESSELS/RETROPERITONEUM: Stable IVC filter in place. Moderate aortoiliac calcifications. No abdominal aortic aneurysm. No suspicious retroperitoneal  adenopathy. No suspicious mesenteric adenopathy. No suspicious pelvic or inguinal adenopathy.   PERITONEUM: No ascites. No pneumoperitoneum. No peritoneal or mesenteric mass or inflammation.   BOWEL: The stomach was  grossly intact. There was no small bowel dilatation or small bowel wall thickening. No small-bowel obstruction. Moderate diffuse retained colonic stool. There is sigmoid diverticulosis. There is also distal left colon diverticulosis. There was no colonic wall thickening or large bowel obstruction.  No edema adjacent to the colon. The cecal appendix could not be identified.   BONES: No destructive lytic or blastic bone lesion. Interfacet hypertrophic arthritic changes in the mid and distal lumbar spine. Trace anterolisthesis of L4 over L5. Mild endplate spurring at L3-4 and L4-5.   ABDOMINAL WALL: Tiny fat containing umbilical hernia..       Moderate diffuse retained colonic stool. Left colon and sigmoid diverticulosis without acute associated inflammation.   Stable IVC filter.   Contracted gallbladder with mild cholelithiasis.   Tiny fat containing umbilical hernia.   Mild fibroid uterus.   MACRO: None   Signed by: Juan Hernández 5/8/2025 9:35 AM Dictation workstation:   [id number]    CT angio chest for pulmonary embolism  Result Date: 5/8/2025  Interpreted By:  Juan Hernández, STUDY: CT ANGIO CHEST FOR PULMONARY EMBOLISM;  5/8/2025 8:50 am   INDICATION: 69 y/o   F with  Signs/Symptoms:shortness of breath.     LIMITATIONS: None.   ACCESSION NUMBER(S): [id number]   ORDERING CLINICIAN: RANDY FARIAS   TECHNIQUE: After the administration of intravenous nonionic contrast, thin-section arterial phase images were obtained  from the thoracic inlet down through the iliac crest. Sagittal and coronal reconstruction images were generated. Axial and coronal MIP vascular reconstruction images were also generated.   Mediastinal, lung, bone, and liver windows were reviewed. Omnipaque 350   90 ML      COMPARISON: Comparison with CT scan chest from 09/14/2021 and with CT scan chest, abdomen, and pelvis from 04/28/2025.. Also, patient had CT scan abdomen and pelvis immediately after today's CT scan chest.   FINDINGS: CHEST WALL/BASE OF THE NECK: The chest wall was grossly intact. No thyromegaly or thyroid mass.   MEDIASTINUM/DENISE: No suspicious mediastinal, hilar, or axillary mass or adenopathy. No cardiomegaly. Mild-to-moderate coronary artery calcifications involving LAD and RCA vessels. No pericardial effusion. No thoracic aortic aneurysm or dissection. No pulmonary artery filling defect.   LUNGS/ PLEURA/ AND TRACHEA: Hypoventilatory lungs. Very mild stable scarring at the extreme posteromedial left lung base. No mass or pneumonia in either lung. No pleural effusion or pneumothorax. The trachea was grossly intact.   BONES: No destructive lytic or blastic bone lesion. No CT evidence of acute fracture of the osseous thorax in this exam.   UPPER ABDOMEN: The imaged upper abdomen was grossly intact.       No CT evidence of pulmonary embolism in the current exam.   Hypoventilatory lungs.   Mild stable scarring at the extreme left lung base.   Mild-to-moderate 2 vessel bilateral coronary artery calcifications.   MACRO: None   Signed by: Juan Hernández 5/8/2025 9:16 AM Dictation workstation:   [id number]    ECG 12 lead  Result Date: 5/8/2025  Sinus bradycardia Nonspecific T wave abnormality Abnormal ECG When compared with ECG of 28-APR-2025 21:12, No significant change was found    CT brain attack angio head and neck W and WO IV contrast  Result Date: 5/7/2025  Interpreted By:  Parker Lakhani, STUDY: CT BRAIN ATTACK ANGIO HEAD AND NECK W AND WO IV CONTRAST;  5/7/2025 3:50 pm   INDICATION: Signs/Symptoms: VAN+.   COMPARISON: CT head today.   ACCESSION NUMBER(S): [id number]   ORDERING CLINICIAN: BEV MATAMOROS   TECHNIQUE: 75 ML of Omnipaque 350 was administered intravenously and axial images of the head and neck  were acquired.  Coronal, sagittal, and 3-D reconstructions were provided for review.   FINDINGS:   CTA COW: No major vascular occlusion. There is moderate to severe fusiform narrowing of a sylvian segment of the left middle cerebral artery without evidence of occlusion. No aneurysms.  No vascular malformations. Patent dural venous sinuses and intracranial deep venous structures.   CTA CAROTID: No aortic aneurysm or dissection. No significant stenoses at the origins of the great vessels from the aortic arch. Involvement of the proximal right subclavian artery results in approximately moderate short-segment stenosis of that segment.   No significant stenoses bilateral carotid arterial systems. No significant stenoses bilateral vertebral arterial systems.   NONVASCULAR NECK: No soft tissue mass. No adenopathy. Salivary glands are unremarkable. Thyroid gland unremarkable. Bones intact.         1. No intracranial major vascular occlusion. There is moderate to severe fusiform narrowing of a sylvian segment of the left middle cerebral artery without evidence of occlusion. 2. No flow-limiting stenosis or significant plaque irregularity in the neck. 3. Involvement of the proximal right subclavian artery results in approximately moderate short-segment stenosis of that segment.   MACRO: None   Signed by: Parker Lakhani 5/7/2025 4:21 PM Dictation workstation:   [id number]    XR chest 1 view  Result Date: 5/7/2025  Interpreted By:  Minh Allred, STUDY: XR CHEST 1 VIEW;  5/7/2025 3:24 pm   INDICATION: Signs/Symptoms:AMS.     COMPARISON: 04/28/2025   ACCESSION NUMBER(S): [id number]   ORDERING CLINICIAN: BEV MATAMOROS   FINDINGS:         CARDIOMEDIASTINAL SILHOUETTE: Cardiomediastinal silhouette is normal in size and configuration.   LUNGS: Lungs are clear. No consolidation or effusion seen. There is elevation of the left hemidiaphragm   ABDOMEN: No remarkable upper abdominal findings.   BONES: No acute osseous changes.        1.  No evidence of acute cardiopulmonary process.       MACRO: None   Signed by: Minh Allred 5/7/2025 3:35 PM Dictation workstation:   [id number]    CT brain attack head wo IV contrast  Result Date: 5/7/2025  Interpreted By:  Tate Srinivasan, STUDY: CT BRAIN ATTACK HEAD WO IV CONTRAST;  5/7/2025 3:18 pm   INDICATION: Signs/Symptoms:Stroke Evaluation.     COMPARISON: 04/28/2025.   ACCESSION NUMBER(S): [id number]   ORDERING CLINICIAN: BEV MATAMOROS   TECHNIQUE: Noncontrast axial CT scan of head was performed. Angled reformats in brain and bone windows were generated. The images were reviewed in bone, brain, blood and soft tissue windows.   FINDINGS: Calvarium: Large left convexity craniotomy with reconstruction. No acute calvarial fracture.   Paranasal sinuses and mastoids: Visualized paranasal sinuses and mastoids are clear.   Parenchyma/CSF Spaces: Large low-attenuation defect within the left frontoparietal region and left temporal lobe compatible with encephalomalacia related to remote insult subjacent to the craniotomy defect. Dural calcifications along the left frontal convexity. Remote left cerebellar hemisphere infarct. No mass effect or midline shift. No acute intracranial hemorrhage. No hydrocephalus. Patchy low-attenuation within the periventricular and subcortical white matter commonly attributed to chronic microvascular ischemia patients at this age.Global cerebral and cerebellar volume loss with ex vacuo ventricular dilatation is age appropriate. Intracranial atherosclerosis           No CT evidence of acute intracranial abnormality.   Large left convexity craniotomy status post reconstruction with subjacent encephalomalacia likely sequela of prior insult including infarct.   Chronic senescent changes including white matter disease commonly attributed to chronic microvascular ischemia and age-appropriate global cerebral and cerebellar volume loss.   MACRO: None     Signed by: Tate Srinivasan  5/7/2025 3:31 PM Dictation workstation:   [id number]      Medications  Scheduled Medications[2]   PRN Medications[3]     Assessment/Plan   Principal Problem:    Symptomatic anemia      70-year-old female, who is known to have history of CVA, with right hemiparesis, hypertension, expressive aphasia, hyperlipidemia, seizure, severe anxiety depression, lives in an assisted living, she was sent here because she is more lethargic than usual.  She had a stroke protocol done there is no evidence of stroke.  She was admitted with acute anemia but there is no evidence of any bleeding noted.  Her hemoglobin dropped but then it is back to normal at 13.  Repeating it.  Admitted as acute anemia, it does not appear to  correlate with the clinical picture, as no bleeding also, we are repeating the lab.      Acute encephalopathy, no infectious causes found, CT of chest abdomen pelvis unremarkable for any acute findings,.    Seizures continuing the same meds.  Severe anxiety depression  Chronic pain I have come down on the Lyrica.  DVT prophylaxis         I spent 65 minutes in the professional and overall care of this patient.    Cookie Cobb MD       [1] No family history on file.  [2] amLODIPine, 5 mg, oral, Daily  aspirin, 81 mg, oral, Daily  busPIRone, 10 mg, oral, TID  carbidopa-levodopa, 2 tablet, oral, TID  cholecalciferol, 50 mcg, oral, Daily  citalopram, 30 mg, oral, Daily  clonazePAM, 0.5 mg, oral, TID  divalproex sprinkle, 125 mg, oral, BID  docusate sodium, 100 mg, oral, BID  enoxaparin, 40 mg, subcutaneous, q24h DOROTHEA  levETIRAcetam, 500 mg, oral, BID  polyethylene glycol, 17 g, oral, Daily  prazosin, 1 mg, oral, Nightly  pregabalin, 100 mg, oral, TID  [3] PRN medications: acetaminophen, dextrose, dextrose, glucagon, glucagon

## 2025-05-08 NOTE — PROGRESS NOTES
05/08/25 0703   [facility] Disability Status   Are you deaf or do you have serious difficulty hearing? N   Are you blind or do you have serious difficulty seeing, even when wearing glasses? N   Because of a physical, mental, or emotional condition, do you have serious difficulty concentrating, remembering, or making decisions? (5 years old or older) Y  (previous stroke, residual expressive aphasia and R hemiparesis, Parkinson's disease, focal epilepsy)   Do you have serious difficulty walking or climbing stairs? Y   Do you have serious difficulty dressing or bathing? Y   Because of a physical, mental, or emotional condition, do you have serious difficulty doing errands alone such as visiting the doctor? Y

## 2025-05-08 NOTE — CARE PLAN
The patient's goals for the shift include to get well.    The clinical goals for the shift include to keep pt safe and free from falls.        Problem: Fall/Injury  Goal: Not fall by end of shift  Outcome: Progressing     Problem: Fall/Injury  Goal: Be free from injury by end of the shift  Outcome: Progressing

## 2025-05-08 NOTE — PROGRESS NOTES
Transitional Care Coordination Progress Note:  Plan per Medical/Surgical team: treatment of fall, change in mental status & anemia with CTSCANs pending, cleared by neuro   Status: Observation  Payor source: [facility] complete mcare  Discharge disposition: [facility]  Potential Barriers: H/H improved @ 13.2/41.4  ADOD: 5/8/2025   MERVAT Dejesus RN, BSN Transitional Care Coordinator ED# [phone]      05/08/25 0703   Discharge Planning   Living Arrangements Alone   Support Systems Family members   Assistance Needed CTSCANS pending   Type of Residence Nursing home/residential care   Do you have animals or pets at home? No   Home or Post Acute Services Post acute facilities (Rehab/SNF/etc)   Type of Post Acute Facility Services Long term care   Expected Discharge Disposition Inter  ([facility])   Does the patient need discharge transport arranged? Yes   RoundTrip coordination needed? Yes   Has discharge transport been arranged? No   What day is the transport expected? 05/08/25   Financial Resource Strain   How hard is it for you to pay for the very basics like food, housing, medical care, and heating? Pt Unable   Housing Stability   In the last 12 months, was there a time when you were not able to pay the mortgage or rent on time? Pt Unable   In the past 12 months, how many times have you moved where you were living? 1   At any time in the past 12 months, were you homeless or living in a shelter (including now)? Pt Unable   Transportation Needs   In the past 12 months, has lack of transportation kept you from medical appointments or from getting medications? Pt Unable   In the past 12 months, has lack of transportation kept you from meetings, work, or from getting things needed for daily living? Pt Unable   Patient Choice   Provider Choice list and CMS website (https://medicare.gov/care-compare#search) for post-acute Quality and Resource Measure Data were provided and reviewed with: Family   Patient /  Family choosing to utilize agency / facility established prior to hospitalization Yes   Stroke Family Assessment   Stroke Family Assessment Needed No   Intensity of Service   Intensity of Service 0-30 min

## 2025-05-09 VITALS
SYSTOLIC BLOOD PRESSURE: 101 MMHG | OXYGEN SATURATION: 97 % | WEIGHT: 159 LBS | RESPIRATION RATE: 16 BRPM | HEART RATE: 78 BPM | DIASTOLIC BLOOD PRESSURE: 86 MMHG | BODY MASS INDEX: 31.22 KG/M2 | TEMPERATURE: 96.3 F | HEIGHT: 60 IN

## 2025-05-09 LAB
ATRIAL RATE: 54 BPM
GLUCOSE BLD MANUAL STRIP-MCNC: 109 MG/DL (ref 74–99)
GLUCOSE BLD MANUAL STRIP-MCNC: 78 MG/DL (ref 74–99)
GLUCOSE BLD MANUAL STRIP-MCNC: 92 MG/DL (ref 74–99)
HCT VFR BLD AUTO: 40 % (ref 36–46)
HGB BLD-MCNC: 12.6 G/DL (ref 12–16)
P AXIS: 73 DEGREES
P OFFSET: 201 MS
P ONSET: 142 MS
PR INTERVAL: 162 MS
Q ONSET: 223 MS
QRS COUNT: 9 BEATS
QRS DURATION: 80 MS
QT INTERVAL: 434 MS
QTC CALCULATION(BAZETT): 411 MS
QTC FREDERICIA: 418 MS
R AXIS: 29 DEGREES
T AXIS: 22 DEGREES
T OFFSET: 440 MS
VENTRICULAR RATE: 54 BPM

## 2025-05-09 PROCEDURE — 96361 HYDRATE IV INFUSION ADD-ON: CPT

## 2025-05-09 PROCEDURE — 36415 COLL VENOUS BLD VENIPUNCTURE: CPT

## 2025-05-09 PROCEDURE — 2500000004 HC RX 250 GENERAL PHARMACY W/ HCPCS (ALT 636 FOR OP/ED)

## 2025-05-09 PROCEDURE — 85014 HEMATOCRIT: CPT

## 2025-05-09 PROCEDURE — 2500000001 HC RX 250 WO HCPCS SELF ADMINISTERED DRUGS (ALT 637 FOR MEDICARE OP)

## 2025-05-09 PROCEDURE — 82947 ASSAY GLUCOSE BLOOD QUANT: CPT

## 2025-05-09 PROCEDURE — 96372 THER/PROPH/DIAG INJ SC/IM: CPT

## 2025-05-09 PROCEDURE — 2500000001 HC RX 250 WO HCPCS SELF ADMINISTERED DRUGS (ALT 637 FOR MEDICARE OP): Performed by: INTERNAL MEDICINE

## 2025-05-09 PROCEDURE — G0378 HOSPITAL OBSERVATION PER HR: HCPCS

## 2025-05-09 RX ORDER — PREGABALIN 100 MG/1
100 CAPSULE ORAL 3 TIMES DAILY
Start: 2025-05-09

## 2025-05-09 RX ORDER — AMLODIPINE BESYLATE 5 MG/1
5 TABLET ORAL DAILY
Start: 2025-05-09

## 2025-05-09 RX ADMIN — BUSPIRONE HYDROCHLORIDE 10 MG: 10 TABLET ORAL at 20:22

## 2025-05-09 RX ADMIN — DIVALPROEX SODIUM 125 MG: 125 CAPSULE ORAL at 10:25

## 2025-05-09 RX ADMIN — CARBIDOPA AND LEVODOPA 2 TABLET: 25; 100 TABLET ORAL at 16:38

## 2025-05-09 RX ADMIN — LEVETIRACETAM 500 MG: 500 TABLET, FILM COATED ORAL at 20:21

## 2025-05-09 RX ADMIN — ENOXAPARIN SODIUM 40 MG: 40 INJECTION SUBCUTANEOUS at 08:28

## 2025-05-09 RX ADMIN — AMLODIPINE BESYLATE 5 MG: 5 TABLET ORAL at 08:21

## 2025-05-09 RX ADMIN — POLYETHYLENE GLYCOL 3350 17 G: 17 POWDER, FOR SOLUTION ORAL at 08:18

## 2025-05-09 RX ADMIN — CARBIDOPA AND LEVODOPA 2 TABLET: 25; 100 TABLET ORAL at 20:22

## 2025-05-09 RX ADMIN — PRAZOSIN HYDROCHLORIDE 1 MG: 1 CAPSULE ORAL at 20:30

## 2025-05-09 RX ADMIN — DOCUSATE SODIUM 100 MG: 100 CAPSULE, LIQUID FILLED ORAL at 08:26

## 2025-05-09 RX ADMIN — PREGABALIN 100 MG: 100 CAPSULE ORAL at 20:21

## 2025-05-09 RX ADMIN — BUSPIRONE HYDROCHLORIDE 10 MG: 10 TABLET ORAL at 08:25

## 2025-05-09 RX ADMIN — SODIUM CHLORIDE 250 ML: 0.9 INJECTION, SOLUTION INTRAVENOUS at 11:48

## 2025-05-09 RX ADMIN — DIVALPROEX SODIUM 125 MG: 125 CAPSULE ORAL at 20:22

## 2025-05-09 RX ADMIN — Medication 50 MCG: at 08:22

## 2025-05-09 RX ADMIN — DOCUSATE SODIUM 100 MG: 100 CAPSULE, LIQUID FILLED ORAL at 20:22

## 2025-05-09 RX ADMIN — PREGABALIN 100 MG: 100 CAPSULE ORAL at 16:38

## 2025-05-09 RX ADMIN — CLONAZEPAM 0.5 MG: 0.5 TABLET ORAL at 20:22

## 2025-05-09 RX ADMIN — LEVETIRACETAM 500 MG: 500 TABLET, FILM COATED ORAL at 08:23

## 2025-05-09 RX ADMIN — CLONAZEPAM 0.5 MG: 0.5 TABLET ORAL at 08:22

## 2025-05-09 RX ADMIN — PREGABALIN 100 MG: 100 CAPSULE ORAL at 08:27

## 2025-05-09 RX ADMIN — CLONAZEPAM 0.5 MG: 0.5 TABLET ORAL at 16:38

## 2025-05-09 RX ADMIN — BUSPIRONE HYDROCHLORIDE 10 MG: 10 TABLET ORAL at 16:38

## 2025-05-09 RX ADMIN — CARBIDOPA AND LEVODOPA 2 TABLET: 25; 100 TABLET ORAL at 08:20

## 2025-05-09 RX ADMIN — ASPIRIN 81 MG: 81 TABLET, COATED ORAL at 08:26

## 2025-05-09 RX ADMIN — CITALOPRAM HYDROBROMIDE 30 MG: 10 TABLET ORAL at 08:24

## 2025-05-09 ASSESSMENT — COGNITIVE AND FUNCTIONAL STATUS - GENERAL
PERSONAL GROOMING: A LITTLE
DRESSING REGULAR UPPER BODY CLOTHING: A LITTLE
DAILY ACTIVITIY SCORE: 18
TURNING FROM BACK TO SIDE WHILE IN FLAT BAD: A LITTLE
MOVING FROM LYING ON BACK TO SITTING ON SIDE OF FLAT BED WITH BEDRAILS: A LITTLE
MOBILITY SCORE: 16
HELP NEEDED FOR BATHING: A LITTLE
DRESSING REGULAR LOWER BODY CLOTHING: A LOT
WALKING IN HOSPITAL ROOM: A LOT
STANDING UP FROM CHAIR USING ARMS: A LITTLE
CLIMB 3 TO 5 STEPS WITH RAILING: A LOT
TOILETING: A LITTLE
MOVING TO AND FROM BED TO CHAIR: A LITTLE

## 2025-05-09 NOTE — DISCHARGE SUMMARY
Discharge Diagnosis  Acute encephalopathy,           Issues Requiring Follow-Up  Psychiatrist,    Discharge Meds     Medication List      START taking these medications     amLODIPine 5 mg tablet; Commonly known as: Norvasc; Take 1 tablet (5 mg)   by mouth once daily.     CHANGE how you take these medications     pregabalin 100 mg capsule; Commonly known as: Lyrica; Take 1 capsule   (100 mg) by mouth 3 times a day.; What changed: medication strength, how   much to take     CONTINUE taking these medications     acetaminophen 325 mg tablet; Commonly known as: Tylenol   Acidophilus capsule; Generic drug: lactobacillus acidophilus   Ambien 5 mg tablet; Generic drug: zolpidem   aspirin 81 mg EC tablet   busPIRone 10 mg tablet; Commonly known as: Buspar   carbidopa-levodopa  mg tablet; Commonly known as: Sinemet   citalopram 30 mg capsule   clonazePAM 0.5 mg tablet; Commonly known as: KlonoPIN   divalproex sprinkle 125 mg DR capsule; Commonly known as: Depakote   Sprinkle   docusate sodium 100 mg capsule; Commonly known as: Colace   levETIRAcetam 500 mg tablet; Commonly known as: Keppra   loperamide 2 mg tablet; Commonly known as: Imodium A-D   multivitamin with minerals tablet   prazosin 1 mg capsule; Commonly known as: Minipress   traZODone 100 mg tablet; Commonly known as: Desyrel   valACYclovir 500 mg tablet; Commonly known as: Valtrex   Voltaren Arthritis Pain 1 % gel; Generic drug: diclofenac sodium     STOP taking these medications     metoprolol tartrate 25 mg tablet; Commonly known as: Lopressor     ASK your doctor about these medications     atorvastatin 40 mg tablet; Commonly known as: Lipitor; Take 1 tablet (40   mg) by mouth once daily at bedtime.   cholecalciferol 25 mcg (1,000 units) capsule; Commonly known as: Vitamin   D-3       Test Results Pending At Discharge  Pending Labs       No current pending labs.            Hospital Course   70-year-old female, who is known to have history of CVA, with  right hemiparesis, hypertension, expressive aphasia, hyperlipidemia, seizure, severe anxiety depression, lives in an assisted living, she was sent here because she is more lethargic than usual.  She had a stroke protocol done there is no evidence of stroke.  She was admitted with acute anemia but there is no evidence of any bleeding noted.  Her hemoglobin dropped but then it is back to normal at 13.  Repeating it.  Admitted as acute anemia, it does not appear to  correlate with the clinical picture, as no bleeding also, we are repeating the lab.        Acute encephalopathy, no infectious causes found, CT of chest abdomen pelvis unremarkable for any acute findings,.     Seizures continuing the same meds.  Severe anxiety depression  Chronic pain I have come down on the Lyrica.    Pertinent Physical Exam At Time of Discharge  Physical Exam  Awake comfortable.  HEENT unremarkable.  Expressive aphasia.  Heart S1-S2.  Right hemiparesis.  Wheelchair-bound.    Outpatient Follow-Up  Future Appointments   Date Time Provider Department Center   5/29/2025  2:00 PM Crispin Marte MD [id number]     Discharge more than 35 minutes    Cookie Cobb MD

## 2025-05-09 NOTE — PROGRESS NOTES
05/09/25 1110   Discharge Planning   Home or Post Acute Services Post acute facilities (Rehab/SNF/etc)   Type of Post Acute Facility Services Assisted living   Expected Discharge Disposition Home  ([facility]  Report number [phone])   Does the patient need discharge transport arranged? Yes   RoundTrip coordination needed? Yes     No barriers to return to [facility].  Report number above--ask for AL    Patient is wheelchair bound. Currently adjusting meds.  Will continue to follow for discharge planning needs.

## 2025-05-09 NOTE — CARE PLAN
The patient's goals for the shift include      The clinical goals for the shift include Maintain comfort and safety throughout shift

## 2025-05-09 NOTE — PROGRESS NOTES
5/9/2025 4:24 PM I spoke to Rubi at [facility]. She said patient is from assisted living at [facility]. Leila NARAYANAN

## 2025-05-29 ENCOUNTER — APPOINTMENT (OUTPATIENT)
Dept: NEUROLOGY | Facility: CLINIC | Age: 70
End: 2025-05-29
Payer: COMMERCIAL

## 2025-07-14 ENCOUNTER — APPOINTMENT (OUTPATIENT)
Dept: NEUROLOGY | Facility: CLINIC | Age: 70
End: 2025-07-14
Payer: COMMERCIAL

## 2025-07-14 VITALS — TEMPERATURE: 96.9 F | DIASTOLIC BLOOD PRESSURE: 57 MMHG | HEART RATE: 60 BPM | SYSTOLIC BLOOD PRESSURE: 116 MMHG

## 2025-07-14 DIAGNOSIS — G40.209 PARTIAL SYMPTOMATIC EPILEPSY WITH COMPLEX PARTIAL SEIZURES, NOT INTRACTABLE, WITHOUT STATUS EPILEPTICUS: Primary | ICD-10-CM

## 2025-07-14 DIAGNOSIS — I69.320 APHASIA AS LATE EFFECT OF CEREBROVASCULAR ACCIDENT: ICD-10-CM

## 2025-07-14 DIAGNOSIS — I69.351 HEMIPARESIS AFFECTING RIGHT SIDE AS LATE EFFECT OF CEREBROVASCULAR ACCIDENT (MULTI): ICD-10-CM

## 2025-07-14 DIAGNOSIS — G20.A1 PARKINSON'S DISEASE WITHOUT DYSKINESIA OR FLUCTUATING MANIFESTATIONS: ICD-10-CM

## 2025-07-14 DIAGNOSIS — R51.9 NONINTRACTABLE HEADACHE, UNSPECIFIED CHRONICITY PATTERN, UNSPECIFIED HEADACHE TYPE: ICD-10-CM

## 2025-07-14 PROCEDURE — 3074F SYST BP LT 130 MM HG: CPT | Performed by: PSYCHIATRY & NEUROLOGY

## 2025-07-14 PROCEDURE — 99214 OFFICE O/P EST MOD 30 MIN: CPT | Performed by: PSYCHIATRY & NEUROLOGY

## 2025-07-14 PROCEDURE — 1159F MED LIST DOCD IN RCRD: CPT | Performed by: PSYCHIATRY & NEUROLOGY

## 2025-07-14 PROCEDURE — 1160F RVW MEDS BY RX/DR IN RCRD: CPT | Performed by: PSYCHIATRY & NEUROLOGY

## 2025-07-14 PROCEDURE — 3078F DIAST BP <80 MM HG: CPT | Performed by: PSYCHIATRY & NEUROLOGY

## 2025-07-14 PROCEDURE — 1036F TOBACCO NON-USER: CPT | Performed by: PSYCHIATRY & NEUROLOGY

## 2025-07-14 NOTE — PROGRESS NOTES
NEUROLOGY OUTPATIENT FOLLOW-UP NOTE    Assessment/Plan   Diagnoses and all orders for this visit:  Partial symptomatic epilepsy with complex partial seizures, not intractable, without status epilepticus  Hemiparesis affecting right side as late effect of cerebrovascular accident (Multi)  Aphasia as late effect of cerebrovascular accident  Parkinson's disease without dyskinesia or fluctuating manifestations  Nonintractable headache, unspecified chronicity pattern, unspecified headache type      IMPRESSION: Chronic daily headaches in the context of prior craniotomy, with reduced severity on gabapentin, Stable Parkinson's disease. Residual right hemiparesis and aphasia referable to prior stroke. Controlled partial seizures on levetiracetam.     PLAN: Pregabalin 100 mg tid to improve headache prophylaxis, To continue the current dose of carbidopa/levodopa  tid with meals for the PD. To continue levetiracetam 500 mg bid for seizure prophylaxis. I will see her in six months or prn.      Crispin Marte Jr., M.D., FAAN   ----------    Subjective     Danikaangélica Castro is a 70 y.o. year old female here for follow-up.  Her brother accopmanies her.    Pregabalin 150 mg tid stopped the headache.  She was admitted at [facility] in May for drowsiness, and many of her medications were changed, and pregabalin was lowered to 100 mg tid.  On this, she still has fewer headaches.  Speech is better than the last visit.  She is now in assisted living rather than a nursing home.  She remains with right hemiparesis.  PD is stable on carbidopa/levodopa 25/200 tid.  No seizures on levetiracetam 500 mg bid.    Medical History[1]  Surgical History[2]  Social History     Tobacco Use    Smoking status: Never    Smokeless tobacco: Never   Substance Use Topics    Alcohol use: Never     family history is not on file.  Current Medications[3]  Allergies[4]    Objective     /57   Pulse 60   Temp 36.1 °C (96.9 °F)     CONSTITUTIONAL:  No  acute distress     MENTAL STATUS:  Awake, alert, oriented to self and place and partially to time, with good short-term memory, fair awareness of recent events, normal attention span, concentration, and fund of knowledge.     SPEECH AND LANGUAGE:  Dysnomia, dysfluent speech, but can make herself easily understood.  Follows all commands, has difficulty with complex commands, has no dysarthria     CRANIAL NERVES:  II-Vision present, visual fields full to confrontational testing     III/IV/VI--EOMs are present in all directions.  Pupils are symmetrically reactive in dim light.  No ptosis.     V--Normal facial sensation.     VII--No facial asymmetry.     VIII--Hearing present to voice bilaterally.     IX/X--Symmetric soft palate rise.     XI--Normal trapezius power on the left, 4/5 right.     XII--Tongue protrudes without deviation.     MOTOR:  Diffuse right arm weakness: 4/5 deltoid and biceps, 3/5 triceps, 2/5 finger ext, 3/5 finger flex/, diffuse 4/5 right leg power. Hypertonia of the right limbs.      SENSORY:  Present pin sensation in both arms and both legs.     COORDINATION:  Normal finger-to-nose and heel-to-shin testing in both arms and both legs.     REFLEXES are brisk at the right biceps, triceps, brachioradialis, patella, normal on the left, depressed at both ankles.  The plantar responses are flexor.     GAIT stable with minimal assistance of one.      Crispin Marte Jr., M.D., FAAN         [1] No past medical history on file.  [2]   Past Surgical History:  Procedure Laterality Date    OTHER SURGICAL HISTORY  10/15/2019    Brain surgery   [3]   Current Outpatient Medications:     acetaminophen (Tylenol) 325 mg tablet, Take 2 tablets (650 mg) by mouth every 4 hours if needed for mild pain (1 - 3)., Disp: , Rfl:     amLODIPine (Norvasc) 5 mg tablet, Take 1 tablet (5 mg) by mouth once daily., Disp: , Rfl:     aspirin 81 mg EC tablet, Take 1 tablet (81 mg) by mouth once daily., Disp: , Rfl:      atorvastatin (Lipitor) 40 mg tablet, Take 1 tablet (40 mg) by mouth once daily at bedtime. (Patient not taking: Reported on 5/8/2025), Disp: , Rfl:     busPIRone (Buspar) 10 mg tablet, Take 1 tablet (10 mg) by mouth 3 times a day., Disp: , Rfl:     carbidopa-levodopa (Sinemet)  mg tablet, Take 2 tablets by mouth 3 times a day., Disp: , Rfl:     cholecalciferol (Vitamin D-3) 25 MCG (1000 UT) capsule, Take 2 capsules (50 mcg) by mouth once daily. (Patient not taking: Reported on 5/8/2025), Disp: , Rfl:     citalopram 30 mg capsule, Take 30 mg by mouth once daily., Disp: , Rfl:     clonazePAM (KlonoPIN) 0.5 mg tablet, Take 1 tablet (0.5 mg) by mouth 3 times a day., Disp: , Rfl:     diclofenac sodium (Voltaren Arthritis Pain) 1 % gel, Apply 4.5 inches (4 g) topically 2 times a day as needed (lower back pain)., Disp: , Rfl:     divalproex sprinkle (Depakote Sprinkle) 125 mg DR capsule, Take 1 capsule (125 mg) by mouth 2 times a day., Disp: , Rfl:     docusate sodium (Colace) 100 mg capsule, Take 1 capsule (100 mg) by mouth 2 times a day., Disp: , Rfl:     lactobacillus acidophilus (Acidophilus) capsule, Take 1 capsule by mouth once daily., Disp: , Rfl:     levETIRAcetam (Keppra) 500 mg tablet, Take 1 tablet (500 mg) by mouth 2 times a day., Disp: , Rfl:     loperamide (Imodium A-D) 2 mg tablet, Take 1 tablet (2 mg) by mouth 3 times a day as needed for diarrhea., Disp: , Rfl:     multivitamin with minerals tablet, Take 1 tablet by mouth once daily., Disp: , Rfl:     prazosin (Minipress) 1 mg capsule, Take 1 capsule (1 mg) by mouth once daily at bedtime., Disp: , Rfl:     pregabalin (Lyrica) 100 mg capsule, Take 1 capsule (100 mg) by mouth 3 times a day., Disp: , Rfl:     traZODone (Desyrel) 100 mg tablet, Take 2 tablets (200 mg) by mouth once daily at bedtime., Disp: , Rfl:     valACYclovir (Valtrex) 500 mg tablet, Take 1 tablet (500 mg) by mouth once daily., Disp: , Rfl:     zolpidem (Ambien) 5 mg tablet, Take 0.5  tablets (2.5 mg) by mouth as needed at bedtime for sleep., Disp: , Rfl:   [4]   Allergies  Allergen Reactions    Penicillins Unknown

## 2025-07-14 NOTE — PATIENT INSTRUCTIONS
Continue pregabalin 100 mg three times daily for headache prevention  Continue carbidopa/levodopa 25/100, 2 tablets 3 times daily for Parkinson's  Continue levetiracetam for seizure prevention

## 2025-08-11 ENCOUNTER — APPOINTMENT (OUTPATIENT)
Dept: PAIN MEDICINE | Facility: HOSPITAL | Age: 70
End: 2025-08-11
Payer: MEDICARE

## 2026-01-15 ENCOUNTER — APPOINTMENT (OUTPATIENT)
Dept: NEUROLOGY | Facility: CLINIC | Age: 71
End: 2026-01-15
Payer: COMMERCIAL